# Patient Record
Sex: FEMALE | Race: WHITE | NOT HISPANIC OR LATINO | Employment: FULL TIME | URBAN - METROPOLITAN AREA
[De-identification: names, ages, dates, MRNs, and addresses within clinical notes are randomized per-mention and may not be internally consistent; named-entity substitution may affect disease eponyms.]

---

## 2018-03-26 ENCOUNTER — OFFICE VISIT (OUTPATIENT)
Dept: FAMILY MEDICINE CLINIC | Facility: CLINIC | Age: 53
End: 2018-03-26

## 2018-03-26 VITALS
TEMPERATURE: 97.9 F | DIASTOLIC BLOOD PRESSURE: 99 MMHG | BODY MASS INDEX: 25.68 KG/M2 | SYSTOLIC BLOOD PRESSURE: 156 MMHG | HEART RATE: 101 BPM | WEIGHT: 136 LBS | HEIGHT: 61 IN | RESPIRATION RATE: 16 BRPM

## 2018-03-26 DIAGNOSIS — Z13.6 SCREENING FOR ISCHEMIC HEART DISEASE: ICD-10-CM

## 2018-03-26 DIAGNOSIS — G62.9 SENSORY NEUROPATHY: ICD-10-CM

## 2018-03-26 DIAGNOSIS — I10 ESSENTIAL HYPERTENSION: Primary | ICD-10-CM

## 2018-03-26 PROCEDURE — 99214 OFFICE O/P EST MOD 30 MIN: CPT | Performed by: FAMILY MEDICINE

## 2018-03-26 RX ORDER — IRBESARTAN 150 MG/1
150 TABLET ORAL DAILY
Qty: 30 TABLET | Refills: 1 | Status: SHIPPED | OUTPATIENT
Start: 2018-03-26 | End: 2018-04-26 | Stop reason: SDUPTHER

## 2018-03-26 RX ORDER — NORETHINDRONE AND ETHINYL ESTRADIOL 1 MG-35MCG
KIT ORAL
COMMUNITY
Start: 2018-02-20 | End: 2020-03-18 | Stop reason: ALTCHOICE

## 2018-03-26 RX ORDER — OMEPRAZOLE 20 MG/1
20 CAPSULE, DELAYED RELEASE ORAL DAILY
COMMUNITY
End: 2021-03-18 | Stop reason: ALTCHOICE

## 2018-03-26 RX ORDER — FLUTICASONE PROPIONATE 50 MCG
2 SPRAY, SUSPENSION (ML) NASAL DAILY
COMMUNITY
End: 2021-03-18

## 2018-03-26 NOTE — PROGRESS NOTES
"Chief Complaint   Patient presents with   • Hypertension   • Numbness     left hand, positional       Subjective   This patient presents the office to reestablish.  She has had some problems with her blood pressure over the past 2 months.  Her father does have high blood pressure but it was later in his life.  He is very healthy.  She does have some headaches but no chest pain.    She also has had some intermittent problems of positional numbness of her left hand and arm that has occurred after her oral surgery on March 8.    I have reviewed and updated her medications, medical history and problem list during today's office visit.       Assessment/Plan     Problem List Items Addressed This Visit        Cardiovascular and Mediastinum    Essential hypertension - Primary    Relevant Medications    irbesartan (AVAPRO) 150 MG tablet    Other Relevant Orders    Comprehensive metabolic panel    CBC and Differential    TSH       Nervous and Auditory    Sensory neuropathy, positional, left upper extremity    Relevant Orders    Vitamin D 25 Hydroxy    Vitamin B12 & Folate      Other Visit Diagnoses     Screening for ischemic heart disease        Relevant Orders    Lipid Panel With LDL/HDL Ratio          Orders Placed This Encounter   Procedures   • Comprehensive metabolic panel   • Lipid Panel With LDL/HDL Ratio   • TSH   • Vitamin D 25 Hydroxy   • Vitamin B12 & Folate     Standing Status:   Future     Standing Expiration Date:   4/30/2019   • CBC and Differential     Order Specific Question:   Manual Differential     Answer:   No       F/U in one month         Review of Systems   Cardiovascular: Negative for chest pain.   Neurological: Positive for numbness and headaches.     Objective   /99   Pulse 101   Temp 97.9 °F (36.6 °C) (Oral)   Resp 16   Ht 154.9 cm (61\")   Wt 61.7 kg (136 lb)   BMI 25.70 kg/m²   Body mass index is 25.7 kg/m².  Physical Exam   Constitutional: She is oriented to person, place, and time. " She appears well-developed. No distress.   Eyes: Conjunctivae and lids are normal.   Neck: Carotid bruit is not present.   Cardiovascular: Normal rate, regular rhythm and normal heart sounds.    Pulmonary/Chest: Effort normal and breath sounds normal.   Neurological: She is alert and oriented to person, place, and time. She has normal strength. She displays no atrophy and no tremor. She exhibits normal muscle tone.   Reflex Scores:       Tricep reflexes are 2+ on the right side and 2+ on the left side.       Bicep reflexes are 2+ on the right side and 2+ on the left side.       Brachioradialis reflexes are 2+ on the right side and 2+ on the left side.  Skin: Skin is warm and dry.   Psychiatric: She has a normal mood and affect. Her behavior is normal.   Vitals reviewed.       Social History   Substance Use Topics   • Smoking status: Never Smoker   • Smokeless tobacco: Never Used   • Alcohol use No       Data Reviewed:

## 2018-04-06 LAB
25(OH)D3+25(OH)D2 SERPL-MCNC: 39 NG/ML (ref 30–100)
ALBUMIN SERPL-MCNC: 4.9 G/DL (ref 3.5–5.2)
ALBUMIN/GLOB SERPL: 1.9 G/DL
ALP SERPL-CCNC: 69 U/L (ref 39–117)
ALT SERPL-CCNC: 14 U/L (ref 1–33)
AST SERPL-CCNC: 17 U/L (ref 1–32)
BASOPHILS # BLD AUTO: 0.04 10*3/MM3 (ref 0–0.2)
BASOPHILS NFR BLD AUTO: 0.4 % (ref 0–1.5)
BILIRUB SERPL-MCNC: 0.4 MG/DL (ref 0.1–1.2)
BUN SERPL-MCNC: 21 MG/DL (ref 6–20)
BUN/CREAT SERPL: 25.6 (ref 7–25)
CALCIUM SERPL-MCNC: 10.2 MG/DL (ref 8.6–10.5)
CHLORIDE SERPL-SCNC: 101 MMOL/L (ref 98–107)
CHOLEST SERPL-MCNC: 206 MG/DL (ref 0–200)
CO2 SERPL-SCNC: 24.7 MMOL/L (ref 22–29)
CREAT SERPL-MCNC: 0.82 MG/DL (ref 0.57–1)
EOSINOPHIL # BLD AUTO: 0.18 10*3/MM3 (ref 0–0.7)
EOSINOPHIL NFR BLD AUTO: 1.9 % (ref 0.3–6.2)
ERYTHROCYTE [DISTWIDTH] IN BLOOD BY AUTOMATED COUNT: 12.7 % (ref 11.7–13)
GFR SERPLBLD CREATININE-BSD FMLA CKD-EPI: 73 ML/MIN/1.73
GFR SERPLBLD CREATININE-BSD FMLA CKD-EPI: 88 ML/MIN/1.73
GLOBULIN SER CALC-MCNC: 2.6 GM/DL
GLUCOSE SERPL-MCNC: 96 MG/DL (ref 65–99)
HCT VFR BLD AUTO: 41.5 % (ref 35.6–45.5)
HDLC SERPL-MCNC: 64 MG/DL (ref 40–60)
HGB BLD-MCNC: 13.6 G/DL (ref 11.9–15.5)
IMM GRANULOCYTES # BLD: 0.02 10*3/MM3 (ref 0–0.03)
IMM GRANULOCYTES NFR BLD: 0.2 % (ref 0–0.5)
LDLC SERPL CALC-MCNC: 112 MG/DL (ref 0–100)
LDLC/HDLC SERPL: 1.75 {RATIO}
LYMPHOCYTES # BLD AUTO: 3.49 10*3/MM3 (ref 0.9–4.8)
LYMPHOCYTES NFR BLD AUTO: 36.3 % (ref 19.6–45.3)
MCH RBC QN AUTO: 32.3 PG (ref 26.9–32)
MCHC RBC AUTO-ENTMCNC: 32.8 G/DL (ref 32.4–36.3)
MCV RBC AUTO: 98.6 FL (ref 80.5–98.2)
MONOCYTES # BLD AUTO: 0.57 10*3/MM3 (ref 0.2–1.2)
MONOCYTES NFR BLD AUTO: 5.9 % (ref 5–12)
NEUTROPHILS # BLD AUTO: 5.32 10*3/MM3 (ref 1.9–8.1)
NEUTROPHILS NFR BLD AUTO: 55.3 % (ref 42.7–76)
PLATELET # BLD AUTO: 286 10*3/MM3 (ref 140–500)
POTASSIUM SERPL-SCNC: 4.8 MMOL/L (ref 3.5–5.2)
PROT SERPL-MCNC: 7.5 G/DL (ref 6–8.5)
RBC # BLD AUTO: 4.21 10*6/MM3 (ref 3.9–5.2)
SODIUM SERPL-SCNC: 140 MMOL/L (ref 136–145)
TRIGL SERPL-MCNC: 149 MG/DL (ref 0–150)
TSH SERPL DL<=0.005 MIU/L-ACNC: 3.95 MIU/ML (ref 0.27–4.2)
VLDLC SERPL CALC-MCNC: 29.8 MG/DL (ref 5–40)
WBC # BLD AUTO: 9.62 10*3/MM3 (ref 4.5–10.7)

## 2018-04-26 ENCOUNTER — OFFICE VISIT (OUTPATIENT)
Dept: FAMILY MEDICINE CLINIC | Facility: CLINIC | Age: 53
End: 2018-04-26

## 2018-04-26 VITALS
RESPIRATION RATE: 16 BRPM | DIASTOLIC BLOOD PRESSURE: 84 MMHG | HEART RATE: 75 BPM | SYSTOLIC BLOOD PRESSURE: 138 MMHG | HEIGHT: 61 IN | BODY MASS INDEX: 25.68 KG/M2 | WEIGHT: 136 LBS | TEMPERATURE: 97.9 F

## 2018-04-26 DIAGNOSIS — Z12.11 SCREEN FOR COLON CANCER: ICD-10-CM

## 2018-04-26 DIAGNOSIS — Z23 IMMUNIZATION DUE: ICD-10-CM

## 2018-04-26 DIAGNOSIS — I10 ESSENTIAL HYPERTENSION: Primary | ICD-10-CM

## 2018-04-26 DIAGNOSIS — E78.00 PURE HYPERCHOLESTEROLEMIA: ICD-10-CM

## 2018-04-26 PROCEDURE — 90471 IMMUNIZATION ADMIN: CPT | Performed by: FAMILY MEDICINE

## 2018-04-26 PROCEDURE — 90715 TDAP VACCINE 7 YRS/> IM: CPT | Performed by: FAMILY MEDICINE

## 2018-04-26 PROCEDURE — 99214 OFFICE O/P EST MOD 30 MIN: CPT | Performed by: FAMILY MEDICINE

## 2018-04-26 RX ORDER — IRBESARTAN 150 MG/1
150 TABLET ORAL DAILY
Qty: 90 TABLET | Refills: 3 | Status: SHIPPED | OUTPATIENT
Start: 2018-04-26 | End: 2018-07-02 | Stop reason: SDUPTHER

## 2018-04-26 NOTE — PATIENT INSTRUCTIONS

## 2018-04-26 NOTE — PROGRESS NOTES
"Chief Complaint   Patient presents with   • Hypertension   • Results     review labs       Subjective     Radha Higgins presents to the office today to refill her medications. No medication side effects are reported.  Her blood pressure is to goal.  She occasionally gets an afternoon reading that is higher than goal.  She is tolerating the medication.  Labs have been reviewed and do show pure hypercholesterolemia that does not require treatment at this time.  Her overall cardiovascular ten-year risk is only 3%.    The tingling in her left upper extremity after her recent dental procedure has improved greatly.  She will contact me if that symptom is still present in 6 weeks from now.    I have reviewed and updated her medications, medical history and problem list during today's office visit.     Patient Care Team:  Henry Jacinto MD as PCP - General (Family Medicine)  Golden Kelly MD as Consulting Physician (Obstetrics and Gynecology)    Social History   Substance Use Topics   • Smoking status: Never Smoker   • Smokeless tobacco: Never Used   • Alcohol use No       Review of Systems   Constitutional: Negative for fatigue.   Cardiovascular: Negative for chest pain.   Neurological:        Tingling left upper extremity       Objective     /84 (BP Location: Right arm, Patient Position: Sitting, Cuff Size: Adult)   Pulse 75   Temp 97.9 °F (36.6 °C) (Oral)   Resp 16   Ht 154.9 cm (61\")   Wt 61.7 kg (136 lb)   BMI 25.70 kg/m²     Body mass index is 25.7 kg/m².    Physical Exam   Constitutional: She is oriented to person, place, and time. She appears well-developed. No distress.   Eyes: Conjunctivae and lids are normal.   Neck: Carotid bruit is not present.   Cardiovascular: Normal rate, regular rhythm and normal heart sounds.    Pulmonary/Chest: Effort normal and breath sounds normal.   Neurological: She is alert and oriented to person, place, and time.   Skin: Skin is warm and dry.   Psychiatric: She has a " normal mood and affect. Her behavior is normal.   Vitals reviewed.      Data Reviewed:         Lab Results   Component Value Date    GLU 96 04/06/2018    BUN 21 (H) 04/06/2018    CREATININE 0.82 04/06/2018    EGFRIFNONA 73 04/06/2018    EGFRIFAFRI 88 04/06/2018     04/06/2018    K 4.8 04/06/2018     04/06/2018    CALCIUM 10.2 04/06/2018    PROTENTOTREF 7.5 04/06/2018    ALBUMIN 4.90 04/06/2018    LABGLOBREF 2.6 04/06/2018    BILITOT 0.4 04/06/2018    ALKPHOS 69 04/06/2018    AST 17 04/06/2018    ALT 14 04/06/2018     CBC w/ diff:   Lab Results   Component Value Date    WBC 9.62 04/06/2018    RBC 4.21 04/06/2018    HGB 13.6 04/06/2018    HCT 41.5 04/06/2018    MCV 98.6 (H) 04/06/2018    MCH 32.3 (H) 04/06/2018    MCHC 32.8 04/06/2018    RDW 12.7 04/06/2018     04/06/2018    NEUTRORELPCT 55.3 04/06/2018    LYMPHORELPCT 36.3 04/06/2018    MONORELPCT 5.9 04/06/2018    EOSRELPCT 1.9 04/06/2018    BASORELPCT 0.4 04/06/2018     LIPID PANEL:  Lab Results   Component Value Date    CHLPL 206 (H) 04/06/2018    TRIG 149 04/06/2018    HDL 64 (H) 04/06/2018    VLDL 29.8 04/06/2018     (H) 04/06/2018    LDLHDL 1.75 04/06/2018     TSH:  Lab Results   Component Value Date    TSH 3.950 04/06/2018       Assessment/Plan     Problem List Items Addressed This Visit        Cardiovascular and Mediastinum    Essential hypertension - Primary     Hypertension is improving with treatment.  Continue current treatment regimen.  Blood pressure will be reassessed at the next regular appointment.         Relevant Medications    irbesartan (AVAPRO) 150 MG tablet    Other Relevant Orders    Comprehensive metabolic panel    CBC and Differential    TSH    Pure hypercholesterolemia     Lipid abnormalities are newly identified.  Nutritional counseling was provided.  Lipids will be reassessed at our next regular appointment..         Relevant Orders    Lipid Panel With LDL/HDL Ratio      Other Visit Diagnoses     Screen for colon  cancer        Relevant Orders    Amb referral for Screening Colonoscopy (Completed)    Immunization due        Relevant Orders    Tdap Vaccine Greater Than or Equal To 8yo IM          Orders Placed This Encounter   Procedures   • Tdap Vaccine Greater Than or Equal To 8yo IM   • Comprehensive metabolic panel     Standing Status:   Future     Standing Expiration Date:   5/31/2019   • Lipid Panel With LDL/HDL Ratio     Standing Status:   Future     Standing Expiration Date:   5/31/2019   • TSH     Standing Status:   Future     Standing Expiration Date:   5/31/2019   • Amb referral for Screening Colonoscopy     Referral Priority:   Routine     Referral Type:   Diagnostic Medical     Referral Reason:   Specialty Services Required     Referred to Provider:   Dale Abbott MD     Requested Specialty:   Gastroenterology     Number of Visits Requested:   1   • CBC and Differential     Standing Status:   Future     Standing Expiration Date:   5/31/2019     Order Specific Question:   Manual Differential     Answer:   No         Current Outpatient Prescriptions:   •  Calcium Carbonate-Vitamin D3 (CALCIUM 600-D) 600-400 MG-UNIT tablet, Take 2 tablets by mouth Daily., Disp: , Rfl:   •  fluticasone (FLONASE) 50 MCG/ACT nasal spray, 2 sprays into each nostril Daily., Disp: , Rfl:   •  irbesartan (AVAPRO) 150 MG tablet, Take 1 tablet by mouth Daily., Disp: 90 tablet, Rfl: 3  •  Loratadine (ALAVERT PO), Take  by mouth., Disp: , Rfl:   •  Multiple Vitamins-Minerals (MULTIVITAMIN ADULT PO), Take  by mouth., Disp: , Rfl:   •  omeprazole (priLOSEC) 20 MG capsule, Take 20 mg by mouth Daily., Disp: , Rfl:   •  PIRMELLA 1/35 1-35 MG-MCG per tablet, , Disp: , Rfl:     Return in about 1 year (around 4/26/2019) for Recheck.

## 2018-04-26 NOTE — ASSESSMENT & PLAN NOTE
Lipid abnormalities are newly identified.  Nutritional counseling was provided.  Lipids will be reassessed at our next regular appointment..

## 2018-07-02 ENCOUNTER — TELEPHONE (OUTPATIENT)
Dept: FAMILY MEDICINE CLINIC | Facility: CLINIC | Age: 53
End: 2018-07-02

## 2018-07-02 DIAGNOSIS — I10 ESSENTIAL HYPERTENSION: ICD-10-CM

## 2018-07-02 RX ORDER — IRBESARTAN 150 MG/1
150 TABLET ORAL DAILY
Qty: 90 TABLET | Refills: 2 | Status: SHIPPED | OUTPATIENT
Start: 2018-07-02 | End: 2019-03-21 | Stop reason: ALTCHOICE

## 2018-08-30 ENCOUNTER — HOSPITAL ENCOUNTER (OUTPATIENT)
Dept: URGENT CARE | Facility: CLINIC | Age: 53
Setting detail: SPECIMEN
Discharge: HOME | End: 2018-08-30
Attending: FAMILY MEDICINE | Admitting: FAMILY MEDICINE

## 2018-08-30 LAB
BACTERIA SPEC AEROBE CULT: NORMAL
Lab: NORMAL
MICRO REPORT STATUS: NORMAL
SPECIMEN SOURCE: NORMAL

## 2019-02-19 ENCOUNTER — RESULTS ENCOUNTER (OUTPATIENT)
Dept: FAMILY MEDICINE CLINIC | Facility: CLINIC | Age: 54
End: 2019-02-19

## 2019-02-19 DIAGNOSIS — G62.9 SENSORY NEUROPATHY: ICD-10-CM

## 2019-03-08 LAB
FOLATE SERPL-MCNC: >20 NG/ML (ref 4.78–24.2)
VIT B12 SERPL-MCNC: 541 PG/ML (ref 211–946)

## 2019-03-21 ENCOUNTER — OFFICE VISIT (OUTPATIENT)
Dept: FAMILY MEDICINE CLINIC | Facility: CLINIC | Age: 54
End: 2019-03-21

## 2019-03-21 VITALS
WEIGHT: 139 LBS | RESPIRATION RATE: 16 BRPM | HEIGHT: 61 IN | BODY MASS INDEX: 26.24 KG/M2 | DIASTOLIC BLOOD PRESSURE: 93 MMHG | SYSTOLIC BLOOD PRESSURE: 136 MMHG | TEMPERATURE: 97.2 F | HEART RATE: 83 BPM

## 2019-03-21 DIAGNOSIS — J30.1 NON-SEASONAL ALLERGIC RHINITIS DUE TO POLLEN: ICD-10-CM

## 2019-03-21 DIAGNOSIS — I73.00 RAYNAUD'S DISEASE WITHOUT GANGRENE: ICD-10-CM

## 2019-03-21 DIAGNOSIS — I10 ESSENTIAL HYPERTENSION: Primary | ICD-10-CM

## 2019-03-21 DIAGNOSIS — E78.00 PURE HYPERCHOLESTEROLEMIA: ICD-10-CM

## 2019-03-21 DIAGNOSIS — Z12.11 SCREEN FOR COLON CANCER: ICD-10-CM

## 2019-03-21 DIAGNOSIS — Z11.59 NEED FOR HEPATITIS C SCREENING TEST: ICD-10-CM

## 2019-03-21 PROBLEM — G62.9 SENSORY NEUROPATHY: Status: RESOLVED | Noted: 2018-03-26 | Resolved: 2019-03-21

## 2019-03-21 PROCEDURE — 99214 OFFICE O/P EST MOD 30 MIN: CPT | Performed by: FAMILY MEDICINE

## 2019-03-21 RX ORDER — TELMISARTAN 80 MG/1
80 TABLET ORAL DAILY
Qty: 90 TABLET | Refills: 3 | Status: SHIPPED | OUTPATIENT
Start: 2019-03-21 | End: 2020-03-18 | Stop reason: SDUPTHER

## 2019-03-21 NOTE — ASSESSMENT & PLAN NOTE
Lipid abnormalities are unchanged.  Nutritional counseling was provided.  Lipids will be reassessed in 1 year.

## 2019-03-21 NOTE — PROGRESS NOTES
"Chief Complaint   Patient presents with   • Hypertension       Subjective     Radha Higgins presents to the office today to refill her medications. No medication side effects are reported.  Today her blood pressure is slightly elevated.  We will switched to telmisartan due to recall concerns of Irbesartan.  Cholesterol is stable pending lab results.  Retinologist disease is stable.  Overall, she feels well.  She is suffering from seasonal allergies and will continue loratadine and fluticasone.  Her allergies manifest with itchy eyes.    I have reviewed and updated her medications, medical history and problem list during today's office visit.     Patient Care Team:  Henry Jacinto MD as PCP - General (Family Medicine)  Golden Kelly MD as Consulting Physician (Obstetrics and Gynecology)    Social History     Tobacco Use   • Smoking status: Never Smoker   • Smokeless tobacco: Never Used   Substance Use Topics   • Alcohol use: No       Review of Systems   Constitutional: Negative for fatigue.   Cardiovascular: Negative for chest pain.       Objective     /93   Pulse 83   Temp 97.2 °F (36.2 °C) (Oral)   Resp 16   Ht 154.9 cm (61\")   Wt 63 kg (139 lb)   BMI 26.26 kg/m²     Body mass index is 26.26 kg/m².    Physical Exam   Constitutional: She is oriented to person, place, and time. She appears well-developed. No distress.   Eyes: Conjunctivae and lids are normal.   Neck: Carotid bruit is not present.   Cardiovascular: Normal rate, regular rhythm and normal heart sounds.   Pulmonary/Chest: Effort normal and breath sounds normal.   Neurological: She is alert and oriented to person, place, and time.   Skin: Skin is warm and dry.   Psychiatric: She has a normal mood and affect. Her behavior is normal.   Vitals reviewed.      Data Reviewed:                    Assessment/Plan     Problem List Items Addressed This Visit     Essential hypertension - Primary     Hypertension is worsening.  Medication changes per " orders. Change to Telmisartan 80 mg daily due to recall of Irbesartan.  Patient will take twice daily blood pressure readings for about 1-2 weeks and give us those results either through my chart message or through a letter or phone call along with progress update after starting telmisartan for about 1 month.    Blood pressure will be reassessed at the next regular appointment.         Relevant Medications    telmisartan (MICARDIS) 80 MG tablet    Raynaud's disease without gangrene    Pure hypercholesterolemia     Lipid abnormalities are unchanged.  Nutritional counseling was provided.  Lipids will be reassessed in 1 year.         Non-seasonal allergic rhinitis due to pollen     Stable           Other Visit Diagnoses     Screen for colon cancer        Relevant Orders    Amb referral for Screening Colonoscopy    Need for hepatitis C screening test        Relevant Orders    Hepatitis C antibody          Orders Placed This Encounter   Procedures   • Hepatitis C antibody     Standing Status:   Future     Standing Expiration Date:   3/21/2020   • Amb referral for Screening Colonoscopy     Referral Priority:   Routine     Referral Type:   Diagnostic Medical     Referral Reason:   Specialty Services Required     Referred to Provider:   Vipul Martins MD     Number of Visits Requested:   1         Current Outpatient Medications:   •  Calcium Carbonate-Vitamin D3 (CALCIUM 600-D) 600-400 MG-UNIT tablet, Take 2 tablets by mouth Daily., Disp: , Rfl:   •  fluticasone (FLONASE) 50 MCG/ACT nasal spray, 2 sprays into each nostril Daily., Disp: , Rfl:   •  Loratadine (ALAVERT PO), Take  by mouth., Disp: , Rfl:   •  Multiple Vitamins-Minerals (MULTIVITAMIN ADULT PO), Take  by mouth., Disp: , Rfl:   •  omeprazole (priLOSEC) 20 MG capsule, Take 20 mg by mouth Daily., Disp: , Rfl:   •  PIRMELLA 1/35 1-35 MG-MCG per tablet, , Disp: , Rfl:   •  telmisartan (MICARDIS) 80 MG tablet, Take 1 tablet by mouth Daily., Disp: 90 tablet, Rfl:  3    Return in about 1 year (around 3/21/2020).

## 2019-03-21 NOTE — ASSESSMENT & PLAN NOTE
Hypertension is worsening.  Medication changes per orders. Change to Telmisartan 80 mg daily due to recall of Irbesartan.  Patient will take twice daily blood pressure readings for about 1-2 weeks and give us those results either through my chart message or through a letter or phone call along with progress update after starting telmisartan for about 1 month.    Blood pressure will be reassessed at the next regular appointment.

## 2019-03-21 NOTE — PATIENT INSTRUCTIONS
"Check on insurance coverage and cost for Shingrix (newest shingles vaccine) and get the immunization at your local pharmacy. It is more effective than the old Zostavax vaccine and is recommended even if you have had the Zostavax vaccine in the past. For more information, please look at the website below:    https://www.cdc.gov/vaccines/vpd/shingles/public/shingrix/index.html        Fat and Cholesterol Restricted Diet  Getting too much fat and cholesterol in your diet may cause health problems. Following this diet helps keep your fat and cholesterol at normal levels. This can keep you from getting sick.  WHAT TYPES OF FAT SHOULD I CHOOSE?  · Choose monosaturated and polyunsaturated fats. These are found in foods such as olive oil, canola oil, flaxseeds, walnuts, almonds, and seeds.  · Eat more omega-3 fats. Good choices include salmon, mackerel, sardines, tuna, flaxseed oil, and ground flaxseeds.  · Limit saturated fats. These are in animal products such as meats, butter, and cream. They can also be in plant products such as palm oil, palm kernel oil, and coconut oil.  ·   ·   · Avoid foods with partially hydrogenated oils in them. These contain trans fats. Examples of foods that have trans fats are stick margarine, some tub margarines, cookies, crackers, and other baked goods.  WHAT GENERAL GUIDELINES DO I NEED TO FOLLOW?    · Check food labels. Look for the words \"trans fat\" and \"saturated fat.\"  · When preparing a meal:  ¨ Fill half of your plate with vegetables and green salads.  ¨ Fill one fourth of your plate with whole grains. Look for the word \"whole\" as the first word in the ingredient list.  ¨ Fill one fourth of your plate with lean protein foods.  · Limit fruit to two servings a day. Choose fruit instead of juice.  · Eat more foods with soluble fiber. Examples of foods with this type of fiber are apples, broccoli, carrots, beans, peas, and barley. Try to get 20-30 g (grams) of fiber per day.  · Eat more " home-cooked foods. Eat less at restaurants and buffets.  · Limit or avoid alcohol.  · Limit foods high in starch and sugar.  · Limit fried foods.  · Cook foods without frying them. Baking, boiling, grilling, and broiling are all great options.  · Lose weight if you are overweight. Losing even a small amount of weight can help your overall health. It can also help prevent diseases such as diabetes and heart disease.  WHAT FOODS CAN I EAT?  Grains  Whole grains, such as whole wheat or whole grain breads, crackers, cereals, and pasta. Unsweetened oatmeal, bulgur, barley, quinoa, or brown rice. Corn or whole wheat flour tortillas.  Vegetables  Fresh or frozen vegetables (raw, steamed, roasted, or grilled). Green salads.  Fruits  All fresh, canned (in natural juice), or frozen fruits.  Meat and Other Protein Products  Ground beef (85% or leaner), grass-fed beef, or beef trimmed of fat. Skinless chicken or turkey. Ground chicken or turkey. Pork trimmed of fat. All fish and seafood. Eggs. Dried beans, peas, or lentils. Unsalted nuts or seeds. Unsalted canned or dry beans.  Dairy  Low-fat dairy products, such as skim or 1% milk, 2% or reduced-fat cheeses, low-fat ricotta or cottage cheese, or plain low-fat yogurt.  Fats and Oils  Tub margarines without trans fats. Light or reduced-fat mayonnaise and salad dressings. Avocado. Olive, canola, sesame, or safflower oils. Natural peanut or almond butter (choose ones without added sugar and oil).  The items listed above may not be a complete list of recommended foods or beverages. Contact your dietitian for more options.  WHAT FOODS ARE NOT RECOMMENDED?  Grains  White bread. White pasta. White rice. Cornbread. Bagels, pastries, and croissants. Crackers that contain trans fat.  Vegetables  White potatoes. Corn. Creamed or fried vegetables. Vegetables in a cheese sauce.  Fruits  Dried fruits. Canned fruit in light or heavy syrup. Fruit juice.  Meat and Other Protein Products  Fatty  cuts of meat. Ribs, chicken wings, vega, sausage, bologna, salami, chitterlings, fatback, hot dogs, bratwurst, and packaged luncheon meats. Liver and organ meats.  Dairy  Whole or 2% milk, cream, half-and-half, and cream cheese. Whole milk cheeses. Whole-fat or sweetened yogurt. Full-fat cheeses. Nondairy creamers and whipped toppings. Processed cheese, cheese spreads, or cheese curds.  Sweets and Desserts  Corn syrup, sugars, honey, and molasses. Candy. Jam and jelly. Syrup. Sweetened cereals. Cookies, pies, cakes, donuts, muffins, and ice cream.  Fats and Oils  Butter, stick margarine, lard, shortening, ghee, or vega fat. Coconut, palm kernel, or palm oils.  Beverages  Alcohol. Sweetened drinks (such as sodas, lemonade, and fruit drinks or punches).  The items listed above may not be a complete list of foods and beverages to avoid. Contact your dietitian for more information.  Document Released: 06/18/2013 Document Revised: 08/21/2015 Document Reviewed: 03/19/2015  [employer]® Patient Information ©2015 [employer]. This information is not intended to replace advice given to you by your health care provider. Make sure you discuss any questions you have with your health care provider.

## 2019-03-22 ENCOUNTER — RESULTS ENCOUNTER (OUTPATIENT)
Dept: FAMILY MEDICINE CLINIC | Facility: CLINIC | Age: 54
End: 2019-03-22

## 2019-03-22 DIAGNOSIS — I10 ESSENTIAL HYPERTENSION: ICD-10-CM

## 2019-03-22 DIAGNOSIS — E78.00 PURE HYPERCHOLESTEROLEMIA: ICD-10-CM

## 2019-03-22 DIAGNOSIS — Z11.59 NEED FOR HEPATITIS C SCREENING TEST: ICD-10-CM

## 2019-03-29 LAB
ALBUMIN SERPL-MCNC: 4.5 G/DL (ref 3.5–5.2)
ALBUMIN/GLOB SERPL: 1.7 G/DL
ALP SERPL-CCNC: 52 U/L (ref 39–117)
ALT SERPL-CCNC: 10 U/L (ref 1–33)
AST SERPL-CCNC: 15 U/L (ref 1–32)
BASOPHILS # BLD AUTO: 0.05 10*3/MM3 (ref 0–0.2)
BASOPHILS NFR BLD AUTO: 0.6 % (ref 0–1.5)
BILIRUB SERPL-MCNC: 0.5 MG/DL (ref 0.2–1.2)
BUN SERPL-MCNC: 14 MG/DL (ref 6–20)
BUN/CREAT SERPL: 16.9 (ref 7–25)
CALCIUM SERPL-MCNC: 9.2 MG/DL (ref 8.6–10.5)
CHLORIDE SERPL-SCNC: 101 MMOL/L (ref 98–107)
CHOLEST SERPL-MCNC: 193 MG/DL (ref 0–200)
CO2 SERPL-SCNC: 23.4 MMOL/L (ref 22–29)
CREAT SERPL-MCNC: 0.83 MG/DL (ref 0.57–1)
EOSINOPHIL # BLD AUTO: 0.08 10*3/MM3 (ref 0–0.4)
EOSINOPHIL NFR BLD AUTO: 1 % (ref 0.3–6.2)
ERYTHROCYTE [DISTWIDTH] IN BLOOD BY AUTOMATED COUNT: 12.3 % (ref 12.3–15.4)
GLOBULIN SER CALC-MCNC: 2.7 GM/DL
GLUCOSE SERPL-MCNC: 86 MG/DL (ref 65–99)
HCT VFR BLD AUTO: 41 % (ref 34–46.6)
HCV AB S/CO SERPL IA: <0.1 S/CO RATIO (ref 0–0.9)
HDLC SERPL-MCNC: 62 MG/DL (ref 40–60)
HGB BLD-MCNC: 13 G/DL (ref 12–15.9)
IMM GRANULOCYTES # BLD AUTO: 0.02 10*3/MM3 (ref 0–0.05)
IMM GRANULOCYTES NFR BLD AUTO: 0.2 % (ref 0–0.5)
LDLC SERPL CALC-MCNC: 105 MG/DL (ref 0–100)
LDLC/HDLC SERPL: 1.69 {RATIO}
LYMPHOCYTES # BLD AUTO: 2.62 10*3/MM3 (ref 0.7–3.1)
LYMPHOCYTES NFR BLD AUTO: 32 % (ref 19.6–45.3)
MCH RBC QN AUTO: 32.1 PG (ref 26.6–33)
MCHC RBC AUTO-ENTMCNC: 31.7 G/DL (ref 31.5–35.7)
MCV RBC AUTO: 101.2 FL (ref 79–97)
MONOCYTES # BLD AUTO: 0.45 10*3/MM3 (ref 0.1–0.9)
MONOCYTES NFR BLD AUTO: 5.5 % (ref 5–12)
NEUTROPHILS # BLD AUTO: 4.98 10*3/MM3 (ref 1.4–7)
NEUTROPHILS NFR BLD AUTO: 60.7 % (ref 42.7–76)
NRBC BLD AUTO-RTO: 0 /100 WBC (ref 0–0)
PLATELET # BLD AUTO: 269 10*3/MM3 (ref 140–450)
POTASSIUM SERPL-SCNC: 4.4 MMOL/L (ref 3.5–5.2)
PROT SERPL-MCNC: 7.2 G/DL (ref 6–8.5)
RBC # BLD AUTO: 4.05 10*6/MM3 (ref 3.77–5.28)
SODIUM SERPL-SCNC: 139 MMOL/L (ref 136–145)
TRIGL SERPL-MCNC: 132 MG/DL (ref 0–150)
TSH SERPL DL<=0.005 MIU/L-ACNC: 3.46 MIU/ML (ref 0.27–4.2)
VLDLC SERPL CALC-MCNC: 26.4 MG/DL (ref 5–40)
WBC # BLD AUTO: 8.2 10*3/MM3 (ref 3.4–10.8)

## 2019-07-08 ENCOUNTER — PATIENT MESSAGE (OUTPATIENT)
Dept: FAMILY MEDICINE CLINIC | Facility: CLINIC | Age: 54
End: 2019-07-08

## 2019-07-08 NOTE — TELEPHONE ENCOUNTER
Savannah, Janie, LPN 7/8/2019 12:14 PM EDT        ----- Message -----  From: Radha Higgins  Sent: 7/8/2019 11:55 AM  To: [facility] Clinical Pool  Subject: Non-Urgent Medical Question     ----- Message from Laura, Generic sent at 7/8/2019 11:55 AM EDT -----    Hi Dr Jacinto  I am finally getting around to scheduling my colonoscopy. My  who is also a patient of yours went to [facility] last year for his test and I also received registration papers from them last year that I never sent in. When I saw you this year and you ordered the test I received registration papers from [facility]. Is there a preference as to which facility I use?   Thanks!  Radha Higgins

## 2019-07-09 ENCOUNTER — TELEPHONE (OUTPATIENT)
Dept: FAMILY MEDICINE CLINIC | Facility: CLINIC | Age: 54
End: 2019-07-09

## 2019-07-09 NOTE — TELEPHONE ENCOUNTER
Regarding: Non-Urgent Medical Question  Contact: [phone]  ----- Message from Henry Jacinto MD sent at 7/8/2019  6:10 PM EDT -----  Janie please set her up to go to [facility].  Let referral know that she needs her order changed to this practice instead of the [facility] gastro[facility]ologist.  Please refer to Dr. Abrahan Abbott from that group please.  Call her and let her know that you have  taken this step on her behalf.  Thank you so much, Dr. Jacinto     ----- Message from Radha Higgins to Henry Jacinto MD sent at 7/8/2019  1:31 PM -----   We are pretty familiar with [facility].  I took my  and my mom and dad there so if that one is ok, I'll probably go there.  I have last years paperwork to be filled out.  Do you think I can use it ?    ----- Message -----  From: Henry Jacinto MD  Sent: 7/8/19, 12:29 PM  To: Radha Higgins  Subject: RE: Non-Urgent Medical Question    Both are excellent. Let me know if you have a preference. Dr. Jacinto    ----- Message -----     From: Radha Higgins     Sent: 7/8/2019 11:55 AM EDT       To: Henry Jacinto MD  Subject: Non-Urgent Medical Question    Hi Dr Jacinto  I am finally getting around to scheduling my colonoscopy.  My  who is also a patient of yours went to [facility] last year for his test and I also received registration papers from them last year that I never sent in.  When I saw you this year and you ordered the test I received registration papers from [facility].  Is there a preference as to which facility I use?    Thanks!  Radha Higgins

## 2019-09-12 VITALS
RESPIRATION RATE: 18 BRPM | DIASTOLIC BLOOD PRESSURE: 103 MMHG | HEART RATE: 86 BPM | OXYGEN SATURATION: 97 % | SYSTOLIC BLOOD PRESSURE: 113 MMHG | SYSTOLIC BLOOD PRESSURE: 119 MMHG | OXYGEN SATURATION: 100 % | HEART RATE: 98 BPM | DIASTOLIC BLOOD PRESSURE: 68 MMHG | SYSTOLIC BLOOD PRESSURE: 147 MMHG | RESPIRATION RATE: 24 BRPM | WEIGHT: 134 LBS | DIASTOLIC BLOOD PRESSURE: 90 MMHG | DIASTOLIC BLOOD PRESSURE: 66 MMHG | SYSTOLIC BLOOD PRESSURE: 102 MMHG | HEART RATE: 83 BPM | SYSTOLIC BLOOD PRESSURE: 103 MMHG | SYSTOLIC BLOOD PRESSURE: 116 MMHG | RESPIRATION RATE: 20 BRPM | HEART RATE: 93 BPM | SYSTOLIC BLOOD PRESSURE: 138 MMHG | HEART RATE: 84 BPM | RESPIRATION RATE: 17 BRPM | HEART RATE: 90 BPM | OXYGEN SATURATION: 98 % | DIASTOLIC BLOOD PRESSURE: 83 MMHG | DIASTOLIC BLOOD PRESSURE: 64 MMHG | SYSTOLIC BLOOD PRESSURE: 163 MMHG | HEART RATE: 70 BPM | DIASTOLIC BLOOD PRESSURE: 80 MMHG | RESPIRATION RATE: 22 BRPM | OXYGEN SATURATION: 99 % | DIASTOLIC BLOOD PRESSURE: 62 MMHG | DIASTOLIC BLOOD PRESSURE: 81 MMHG | SYSTOLIC BLOOD PRESSURE: 164 MMHG | RESPIRATION RATE: 12 BRPM | HEART RATE: 82 BPM | HEIGHT: 61 IN | DIASTOLIC BLOOD PRESSURE: 55 MMHG | TEMPERATURE: 96.6 F | SYSTOLIC BLOOD PRESSURE: 112 MMHG | SYSTOLIC BLOOD PRESSURE: 129 MMHG | RESPIRATION RATE: 16 BRPM | TEMPERATURE: 97.3 F

## 2019-09-13 ENCOUNTER — OFFICE (OUTPATIENT)
Dept: URBAN - METROPOLITAN AREA PATHOLOGY 4 | Facility: PATHOLOGY | Age: 54
End: 2019-09-13
Payer: COMMERCIAL

## 2019-09-13 ENCOUNTER — AMBULATORY SURGICAL CENTER (OUTPATIENT)
Dept: URBAN - METROPOLITAN AREA SURGERY 17 | Facility: SURGERY | Age: 54
End: 2019-09-13
Payer: COMMERCIAL

## 2019-09-13 DIAGNOSIS — K63.5 POLYP OF COLON: ICD-10-CM

## 2019-09-13 DIAGNOSIS — D12.3 BENIGN NEOPLASM OF TRANSVERSE COLON: ICD-10-CM

## 2019-09-13 DIAGNOSIS — Z12.11 ENCOUNTER FOR SCREENING FOR MALIGNANT NEOPLASM OF COLON: ICD-10-CM

## 2019-09-13 DIAGNOSIS — K62.89 OTHER SPECIFIED DISEASES OF ANUS AND RECTUM: ICD-10-CM

## 2019-09-13 LAB
GI HISTOLOGY: A. UNSPECIFIED: (no result)
GI HISTOLOGY: B. SELECT: (no result)
GI HISTOLOGY: PDF REPORT: (no result)

## 2019-09-13 PROCEDURE — 45380 COLONOSCOPY AND BIOPSY: CPT | Mod: 33 | Performed by: INTERNAL MEDICINE

## 2019-09-13 PROCEDURE — 88305 TISSUE EXAM BY PATHOLOGIST: CPT | Mod: 33 | Performed by: INTERNAL MEDICINE

## 2019-09-13 NOTE — SERVICEHPINOTES
53 yo F with h/o HTN. No fam hx colon ca/polyps. She denies having any GI symptoms.  She denies weight loss.  Has never had colonoscopy before. Discussed r/b/a of the procedure with the patient in the pre op area.

## 2020-03-03 DIAGNOSIS — I10 ESSENTIAL HYPERTENSION: ICD-10-CM

## 2020-03-03 RX ORDER — TELMISARTAN 80 MG/1
TABLET ORAL
Qty: 90 TABLET | Refills: 3 | OUTPATIENT
Start: 2020-03-03

## 2020-03-06 DIAGNOSIS — I10 ESSENTIAL HYPERTENSION: Primary | ICD-10-CM

## 2020-03-06 DIAGNOSIS — E78.00 PURE HYPERCHOLESTEROLEMIA: ICD-10-CM

## 2020-03-06 DIAGNOSIS — R79.9 ABNORMAL BLOOD CHEMISTRY: ICD-10-CM

## 2020-03-12 LAB
ALBUMIN SERPL-MCNC: 4.7 G/DL (ref 3.8–4.9)
ALBUMIN/GLOB SERPL: 1.8 {RATIO} (ref 1.2–2.2)
ALP SERPL-CCNC: 82 IU/L (ref 39–117)
ALT SERPL-CCNC: 25 IU/L (ref 0–32)
AST SERPL-CCNC: 30 IU/L (ref 0–40)
BASOPHILS # BLD AUTO: 0.1 X10E3/UL (ref 0–0.2)
BASOPHILS NFR BLD AUTO: 1 %
BILIRUB SERPL-MCNC: 0.5 MG/DL (ref 0–1.2)
BUN SERPL-MCNC: 17 MG/DL (ref 6–24)
BUN/CREAT SERPL: 22 (ref 9–23)
CALCIUM SERPL-MCNC: 9.8 MG/DL (ref 8.7–10.2)
CHLORIDE SERPL-SCNC: 100 MMOL/L (ref 96–106)
CHOLEST SERPL-MCNC: 206 MG/DL (ref 100–199)
CO2 SERPL-SCNC: 23 MMOL/L (ref 20–29)
CREAT SERPL-MCNC: 0.76 MG/DL (ref 0.57–1)
EOSINOPHIL # BLD AUTO: 0.1 X10E3/UL (ref 0–0.4)
EOSINOPHIL NFR BLD AUTO: 2 %
ERYTHROCYTE [DISTWIDTH] IN BLOOD BY AUTOMATED COUNT: 12 % (ref 11.7–15.4)
GLOBULIN SER CALC-MCNC: 2.6 G/DL (ref 1.5–4.5)
GLUCOSE SERPL-MCNC: 96 MG/DL (ref 65–99)
HCT VFR BLD AUTO: 39.5 % (ref 34–46.6)
HDLC SERPL-MCNC: 89 MG/DL
HGB BLD-MCNC: 12.9 G/DL (ref 11.1–15.9)
IMM GRANULOCYTES # BLD AUTO: 0 X10E3/UL (ref 0–0.1)
IMM GRANULOCYTES NFR BLD AUTO: 0 %
LDLC SERPL CALC-MCNC: 101 MG/DL (ref 0–99)
LYMPHOCYTES # BLD AUTO: 2.6 X10E3/UL (ref 0.7–3.1)
LYMPHOCYTES NFR BLD AUTO: 41 %
MCH RBC QN AUTO: 30.6 PG (ref 26.6–33)
MCHC RBC AUTO-ENTMCNC: 32.7 G/DL (ref 31.5–35.7)
MCV RBC AUTO: 94 FL (ref 79–97)
MONOCYTES # BLD AUTO: 0.4 X10E3/UL (ref 0.1–0.9)
MONOCYTES NFR BLD AUTO: 7 %
NEUTROPHILS # BLD AUTO: 3.2 X10E3/UL (ref 1.4–7)
NEUTROPHILS NFR BLD AUTO: 49 %
PLATELET # BLD AUTO: 273 X10E3/UL (ref 150–450)
POTASSIUM SERPL-SCNC: 4.5 MMOL/L (ref 3.5–5.2)
PROT SERPL-MCNC: 7.3 G/DL (ref 6–8.5)
RBC # BLD AUTO: 4.22 X10E6/UL (ref 3.77–5.28)
SODIUM SERPL-SCNC: 140 MMOL/L (ref 134–144)
TRIGL SERPL-MCNC: 79 MG/DL (ref 0–149)
VLDLC SERPL CALC-MCNC: 16 MG/DL (ref 5–40)
WBC # BLD AUTO: 6.4 X10E3/UL (ref 3.4–10.8)

## 2020-03-18 ENCOUNTER — OFFICE VISIT (OUTPATIENT)
Dept: FAMILY MEDICINE CLINIC | Facility: CLINIC | Age: 55
End: 2020-03-18

## 2020-03-18 VITALS
WEIGHT: 139 LBS | DIASTOLIC BLOOD PRESSURE: 80 MMHG | HEIGHT: 61 IN | TEMPERATURE: 98 F | HEART RATE: 75 BPM | RESPIRATION RATE: 16 BRPM | BODY MASS INDEX: 26.24 KG/M2 | OXYGEN SATURATION: 95 % | SYSTOLIC BLOOD PRESSURE: 122 MMHG

## 2020-03-18 DIAGNOSIS — Z00.00 ENCOUNTER FOR WELLNESS EXAMINATION IN ADULT: Primary | ICD-10-CM

## 2020-03-18 DIAGNOSIS — I10 ESSENTIAL HYPERTENSION: ICD-10-CM

## 2020-03-18 DIAGNOSIS — Z13.6 SCREENING FOR ISCHEMIC HEART DISEASE: ICD-10-CM

## 2020-03-18 DIAGNOSIS — J30.1 NON-SEASONAL ALLERGIC RHINITIS DUE TO POLLEN: ICD-10-CM

## 2020-03-18 DIAGNOSIS — K21.9 GASTROESOPHAGEAL REFLUX DISEASE WITHOUT ESOPHAGITIS: ICD-10-CM

## 2020-03-18 DIAGNOSIS — E78.00 PURE HYPERCHOLESTEROLEMIA: ICD-10-CM

## 2020-03-18 PROBLEM — J20.8 ACUTE BACTERIAL BRONCHITIS: Status: ACTIVE | Noted: 2017-02-18

## 2020-03-18 PROBLEM — B96.89 ACUTE BACTERIAL BRONCHITIS: Status: ACTIVE | Noted: 2017-02-18

## 2020-03-18 PROBLEM — R49.0 HOARSENESS: Status: ACTIVE | Noted: 2017-11-29
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
## 2020-03-18 PROBLEM — R49.0 HOARSENESS: Status: RESOLVED | Noted: 2017-11-29 | Resolved: 2020-03-18

## 2020-03-18 PROBLEM — J02.9 SORE THROAT: Status: ACTIVE | Noted: 2018-08-30

## 2020-03-18 PROBLEM — L03.90 CELLULITIS: Status: RESOLVED | Noted: 2017-10-03 | Resolved: 2020-03-18

## 2020-03-18 PROBLEM — R03.0 ELEVATED BLOOD PRESSURE READING WITHOUT DIAGNOSIS OF HYPERTENSION: Status: ACTIVE | Noted: 2017-11-29
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
## 2020-03-18 PROBLEM — B96.89 ACUTE BACTERIAL BRONCHITIS: Status: RESOLVED | Noted: 2017-02-18 | Resolved: 2020-03-18

## 2020-03-18 PROBLEM — J02.9 SORE THROAT: Status: RESOLVED | Noted: 2018-08-30 | Resolved: 2020-03-18

## 2020-03-18 PROBLEM — L03.90 CELLULITIS: Status: ACTIVE | Noted: 2017-10-03

## 2020-03-18 PROBLEM — J01.00 SINUSITIS, ACUTE MAXILLARY: Status: ACTIVE | Noted: 2017-02-18

## 2020-03-18 PROBLEM — R03.0 ELEVATED BLOOD PRESSURE READING WITHOUT DIAGNOSIS OF HYPERTENSION: Status: RESOLVED | Noted: 2017-11-29 | Resolved: 2020-03-18

## 2020-03-18 PROBLEM — J06.9 VIRAL URI: Status: ACTIVE | Noted: 2017-11-29

## 2020-03-18 PROBLEM — J01.00 SINUSITIS, ACUTE MAXILLARY: Status: RESOLVED | Noted: 2017-02-18 | Resolved: 2020-03-18

## 2020-03-18 PROBLEM — J06.9 VIRAL URI: Status: RESOLVED | Noted: 2017-11-29 | Resolved: 2020-03-18

## 2020-03-18 PROBLEM — J20.8 ACUTE BACTERIAL BRONCHITIS: Status: RESOLVED | Noted: 2017-02-18 | Resolved: 2020-03-18

## 2020-03-18 PROCEDURE — 99214 OFFICE O/P EST MOD 30 MIN: CPT | Performed by: FAMILY MEDICINE

## 2020-03-18 PROCEDURE — 99396 PREV VISIT EST AGE 40-64: CPT | Performed by: FAMILY MEDICINE

## 2020-03-18 RX ORDER — CONJUGATED ESTROGENS/BAZEDOXIFENE .45; 2 MG/1; MG/1
TABLET, FILM COATED ORAL
COMMUNITY
Start: 2020-03-09 | End: 2021-03-18 | Stop reason: ALTCHOICE

## 2020-03-18 RX ORDER — TELMISARTAN 80 MG/1
80 TABLET ORAL DAILY
Qty: 90 TABLET | Refills: 3 | Status: SHIPPED | OUTPATIENT
Start: 2020-03-18 | End: 2021-03-01

## 2020-03-18 NOTE — ASSESSMENT & PLAN NOTE
Hypertension is unchanged.  Continue current treatment regimen.  Blood pressure will be reassessed at the next regular appointment.  Patient notes that her blood pressure is spiking in mid afternoon.  She is drinking some caffeine 3-4 times daily and will try eliminating that.  If her intermittent blood pressure control is still not stabilized within the next 2 months, she will call.

## 2020-03-18 NOTE — PROGRESS NOTES
"   Subjective       Chief Complaint   Patient presents with   • Annual Exam   • Hypertension   • Allergies   • Heartburn   • Hyperlipidemia         HPI:       Radha Higgins is a 54 y.o. female who presents to the office today for annual well visit and also to refill medicines.  Blood pressure control.  GERD symptoms stable with over-the-counter proton pump inhibitor.  Cholesterol is stable.  Please see 10-year CVD risk below.  Medicine not indicated at this time.  Medication list updated.  Overall she feels well.  Preventative measures discussed during today's visit.  Immunizations up-to-date.  Screening testing up-to-date.  Allergies well treated with over-the-counter therapies.    I have reviewed and updated her medications, medical history and problem list during today's office visit.   The 10-year CVD risk score (SEGUN'Agoino, et al., 2008) is: 5%    Values used to calculate the score:      Age: 54 years      Sex: Female      Diabetic: No      Tobacco smoker: No      Systolic Blood Pressure: 122 mmHg      Is BP treated: Yes      HDL Cholesterol: 89 mg/dL      Total Cholesterol: 206 mg/dL  Social History     Tobacco Use   • Smoking status: Never Smoker   • Smokeless tobacco: Never Used   Substance Use Topics   • Alcohol use: No       Review of Systems   Constitutional: Negative for fatigue.   HENT: Negative.    Eyes: Negative.    Respiratory: Negative.    Cardiovascular: Negative.  Negative for chest pain.   Gastrointestinal: Negative.    Endocrine: Negative for cold intolerance and heat intolerance.   Genitourinary: Negative.    Musculoskeletal: Negative.    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   Neurological: Negative.    Hematological: Negative.    Psychiatric/Behavioral: Negative.    All other systems reviewed and are negative.        PE:   Objective   /80   Pulse 75   Temp 98 °F (36.7 °C) (Oral)   Resp 16   Ht 154.9 cm (61\")   Wt 63 kg (139 lb)   SpO2 95%   " BMI 26.26 kg/m²     Body mass index is 26.26 kg/m².    Physical Exam   Constitutional: She is oriented to person, place, and time. Vital signs are normal. She appears well-developed. No distress.   HENT:   Head: Normocephalic and atraumatic.   Right Ear: Hearing and tympanic membrane normal.   Left Ear: Hearing and tympanic membrane normal.   Nose: Nose normal.   Mouth/Throat: Uvula is midline, oropharynx is clear and moist and mucous membranes are normal.   Eyes: Pupils are equal, round, and reactive to light. Conjunctivae, EOM and lids are normal.   Neck: Trachea normal and phonation normal. No JVD present. Carotid bruit is not present. No thyroid mass and no thyromegaly present.   Cardiovascular: Normal rate, regular rhythm and normal heart sounds.   Pulmonary/Chest: Effort normal and breath sounds normal.   Abdominal: Soft. Normal appearance and bowel sounds are normal. There is no hepatosplenomegaly. There is no tenderness.   Musculoskeletal: Normal range of motion.        Lumbar back: She exhibits no deformity.   Lymphadenopathy:     She has no cervical adenopathy.        Right: No supraclavicular adenopathy present.        Left: No supraclavicular adenopathy present.   Neurological: She is alert and oriented to person, place, and time. She has normal strength. No cranial nerve deficit.   Reflex Scores:       Patellar reflexes are 2+ on the right side and 2+ on the left side.  Skin: Skin is warm and dry. No rash noted.   Psychiatric: She has a normal mood and affect. Her speech is normal and behavior is normal. Judgment and thought content normal. Cognition and memory are normal. She is attentive.        Data Reviewed:              Lab Results   Component Value Date    GLU 96 03/11/2020    BUN 17 03/11/2020    CREATININE 0.76 03/11/2020    EGFRIFNONA 88 03/11/2020    EGFRIFAFRI 101 03/11/2020     03/11/2020    K 4.5 03/11/2020     03/11/2020    CALCIUM 9.8 03/11/2020    ALBUMIN 4.7 03/11/2020     BILITOT 0.5 03/11/2020    ALKPHOS 82 03/11/2020    AST 30 03/11/2020    ALT 25 03/11/2020    CHLPL 206 (H) 03/11/2020    TRIG 79 03/11/2020    HDL 89 03/11/2020    VLDL 16 03/11/2020     (H) 03/11/2020    WBC 6.4 03/11/2020    RBC 4.22 03/11/2020    HCT 39.5 03/11/2020    MCV 94 03/11/2020    MCH 30.6 03/11/2020          A/P:     Assessment/Plan   Diagnoses and all orders for this visit:    1. Encounter for wellness examination in adult (Primary)  Comments:  Preventative measures are up-to-date.  Orders:  -     Comprehensive Metabolic Panel; Future  -     CBC & Differential; Future  -     Lipid Panel With / Chol / HDL Ratio; Future    2. Essential hypertension  Assessment & Plan:  Hypertension is unchanged.  Continue current treatment regimen.  Blood pressure will be reassessed at the next regular appointment.  Patient notes that her blood pressure is spiking in mid afternoon.  She is drinking some caffeine 3-4 times daily and will try eliminating that.  If her intermittent blood pressure control is still not stabilized within the next 2 months, she will call.    Orders:  -     telmisartan (MICARDIS) 80 MG tablet; Take 1 tablet by mouth Daily.  Dispense: 90 tablet; Refill: 3  -     Comprehensive Metabolic Panel; Future  -     CBC & Differential; Future  -     TSH; Future    3. Pure hypercholesterolemia  Assessment & Plan:  Lipid abnormalities are unchanged.  Nutritional counseling was provided.  Lipids will be reassessed in 1 year.    Orders:  -     Lipid Panel With / Chol / HDL Ratio; Future  -     CK; Future    4. Non-seasonal allergic rhinitis due to pollen  Assessment & Plan:  Condition stable on current over-the-counter therapy.      5. Gastroesophageal reflux disease without esophagitis  Assessment & Plan:  Condition is controlled on current proton pump inhibitor therapy.  She may try to wean off of it.  She can consider other medications like Pepcid AC which have a lower risk profile.      6. Screening  for ischemic heart disease  -     Comprehensive Metabolic Panel; Future  -     CBC & Differential; Future  -     Lipid Panel With / Chol / HDL Ratio; Future        Follow up:    Return in about 1 year (around 3/18/2021) for Adult Wellness Visit, 30 minute visit, Recheck/Medication  Refill.

## 2020-03-18 NOTE — PATIENT INSTRUCTIONS
"Fat and Cholesterol Restricted Diet  Getting too much fat and cholesterol in your diet may cause health problems. Following this diet helps keep your fat and cholesterol at normal levels. This can keep you from getting sick.  WHAT TYPES OF FAT SHOULD I CHOOSE?  · Choose monosaturated and polyunsaturated fats. These are found in foods such as olive oil, canola oil, flaxseeds, walnuts, almonds, and seeds.  · Eat more omega-3 fats. Good choices include salmon, mackerel, sardines, tuna, flaxseed oil, and ground flaxseeds.  · Limit saturated fats. These are in animal products such as meats, butter, and cream. They can also be in plant products such as palm oil, palm kernel oil, and coconut oil.  ·   ·   · Avoid foods with partially hydrogenated oils in them. These contain trans fats. Examples of foods that have trans fats are stick margarine, some tub margarines, cookies, crackers, and other baked goods.  WHAT GENERAL GUIDELINES DO I NEED TO FOLLOW?    · Check food labels. Look for the words \"trans fat\" and \"saturated fat.\"  · When preparing a meal:  ¨ Fill half of your plate with vegetables and green salads.  ¨ Fill one fourth of your plate with whole grains. Look for the word \"whole\" as the first word in the ingredient list.  ¨ Fill one fourth of your plate with lean protein foods.  · Limit fruit to two servings a day. Choose fruit instead of juice.  · Eat more foods with soluble fiber. Examples of foods with this type of fiber are apples, broccoli, carrots, beans, peas, and barley. Try to get 20-30 g (grams) of fiber per day.  · Eat more home-cooked foods. Eat less at restaurants and buffets.  · Limit or avoid alcohol.  · Limit foods high in starch and sugar.  · Limit fried foods.  · Cook foods without frying them. Baking, boiling, grilling, and broiling are all great options.  · Lose weight if you are overweight. Losing even a small amount of weight can help your overall health. It can also help prevent diseases such " as diabetes and heart disease.  WHAT FOODS CAN I EAT?  Grains  Whole grains, such as whole wheat or whole grain breads, crackers, cereals, and pasta. Unsweetened oatmeal, bulgur, barley, quinoa, or brown rice. Corn or whole wheat flour tortillas.  Vegetables  Fresh or frozen vegetables (raw, steamed, roasted, or grilled). Green salads.  Fruits  All fresh, canned (in natural juice), or frozen fruits.  Meat and Other Protein Products  Ground beef (85% or leaner), grass-fed beef, or beef trimmed of fat. Skinless chicken or turkey. Ground chicken or turkey. Pork trimmed of fat. All fish and seafood. Eggs. Dried beans, peas, or lentils. Unsalted nuts or seeds. Unsalted canned or dry beans.  Dairy  Low-fat dairy products, such as skim or 1% milk, 2% or reduced-fat cheeses, low-fat ricotta or cottage cheese, or plain low-fat yogurt.  Fats and Oils  Tub margarines without trans fats. Light or reduced-fat mayonnaise and salad dressings. Avocado. Olive, canola, sesame, or safflower oils. Natural peanut or almond butter (choose ones without added sugar and oil).  The items listed above may not be a complete list of recommended foods or beverages. Contact your dietitian for more options.  WHAT FOODS ARE NOT RECOMMENDED?  Grains  White bread. White pasta. White rice. Cornbread. Bagels, pastries, and croissants. Crackers that contain trans fat.  Vegetables  White potatoes. Corn. Creamed or fried vegetables. Vegetables in a cheese sauce.  Fruits  Dried fruits. Canned fruit in light or heavy syrup. Fruit juice.  Meat and Other Protein Products  Fatty cuts of meat. Ribs, chicken wings, vega, sausage, bologna, salami, chitterlings, fatback, hot dogs, bratwurst, and packaged luncheon meats. Liver and organ meats.  Dairy  Whole or 2% milk, cream, half-and-half, and cream cheese. Whole milk cheeses. Whole-fat or sweetened yogurt. Full-fat cheeses. Nondairy creamers and whipped toppings. Processed cheese, cheese spreads, or cheese  curds.  Sweets and Desserts  Corn syrup, sugars, honey, and molasses. Candy. Jam and jelly. Syrup. Sweetened cereals. Cookies, pies, cakes, donuts, muffins, and ice cream.  Fats and Oils  Butter, stick margarine, lard, shortening, ghee, or vega fat. Coconut, palm kernel, or palm oils.  Beverages  Alcohol. Sweetened drinks (such as sodas, lemonade, and fruit drinks or punches).  The items listed above may not be a complete list of foods and beverages to avoid. Contact your dietitian for more information.  Document Released: 06/18/2013 Document Revised: 08/21/2015 Document Reviewed: 03/19/2015  [employer]® Patient Information ©2015 [employer]. This information is not intended to replace advice given to you by your health care provider. Make sure you discuss any questions you have with your health care provider.    Exercising to Stay Healthy  To become healthy and stay healthy, it is recommended that you do moderate-intensity and vigorous-intensity exercise. You can tell that you are exercising at a moderate intensity if your heart starts beating faster and you start breathing faster but can still hold a conversation. You can tell that you are exercising at a vigorous intensity if you are breathing much harder and faster and cannot hold a conversation while exercising.  Exercising regularly is important. It has many health benefits, such as:  · Improving overall fitness, flexibility, and endurance.  · Increasing bone density.  · Helping with weight control.  · Decreasing body fat.  · Increasing muscle strength.  · Reducing stress and tension.  · Improving overall health.  How often should I exercise?  Choose an activity that you enjoy, and set realistic goals. Your health care provider can help you make an activity plan that works for you.  Exercise regularly as told by your health care provider. This may include:  · Doing strength training two times a week, such as:  ? Lifting weights.  ? Using resistance  bands.  ? Push-ups.  ? Sit-ups.  ? Yoga.  · Doing a certain intensity of exercise for a given amount of time. Choose from these options:  ? A total of 150 minutes of moderate-intensity exercise every week.  ? A total of 75 minutes of vigorous-intensity exercise every week.  ? A mix of moderate-intensity and vigorous-intensity exercise every week.  Children, pregnant women, people who have not exercised regularly, people who are overweight, and older adults may need to talk with a health care provider about what activities are safe to do. If you have a medical condition, be sure to talk with your health care provider before you start a new exercise program.  What are some exercise ideas?  Moderate-intensity exercise ideas include:  · Walking 1 mile (1.6 km) in about 15 minutes.  · Biking.  · Hiking.  · Golfing.  · Dancing.  · Water aerobics.  Vigorous-intensity exercise ideas include:  · Walking 4.5 miles (7.2 km) or more in about 1 hour.  · Jogging or running 5 miles (8 km) in about 1 hour.  · Biking 10 miles (16.1 km) or more in about 1 hour.  · Lap swimming.  · Roller-skating or in-line skating.  · Cross-country skiing.  · Vigorous competitive sports, such as football, basketball, and soccer.  · Jumping rope.  · Aerobic dancing.  What are some everyday activities that can help me to get exercise?  · Yard work, such as:  ? Pushing a .  ? Raking and bagging leaves.  · Washing your car.  · Pushing a stroller.  · Shoveling snow.  · Gardening.  · Washing windows or floors.  How can I be more active in my day-to-day activities?  · Use stairs instead of an elevator.  · Take a walk during your lunch break.  · If you drive, park your car farther away from your work or school.  · If you take public transportation, get off one stop early and walk the rest of the way.  · Stand up or walk around during all of your indoor phone calls.  · Get up, stretch, and walk around every 30 minutes throughout the day.  · Enjoy  exercise with a friend. Support to continue exercising will help you keep a regular routine of activity.  What guidelines can I follow while exercising?  · Before you start a new exercise program, talk with your health care provider.  · Do not exercise so much that you hurt yourself, feel dizzy, or get very short of breath.  · Wear comfortable clothes and wear shoes with good support.  · Drink plenty of water while you exercise to prevent dehydration or heat stroke.  · Work out until your breathing and your heartbeat get faster.  Where to find more information  · [place] Department of Health and Human Services: www.hhs.gov  · Centers for Disease Control and Prevention (CDC): www.cdc.gov  Summary  · Exercising regularly is important. It will improve your overall fitness, flexibility, and endurance.  · Regular exercise also will improve your overall health. It can help you control your weight, reduce stress, and improve your bone density.  · Do not exercise so much that you hurt yourself, feel dizzy, or get very short of breath.  · Before you start a new exercise program, talk with your health care provider.  This information is not intended to replace advice given to you by your health care provider. Make sure you discuss any questions you have with your health care provider.  Document Released: 01/20/2012 Document Revised: 11/08/2018 Document Reviewed: 11/08/2018  [employer] Interactive Patient Education © 2020 [employer]    Annual flu shot has been recommended to patient. Optimal timing of this vaccination is in mid October of each year.

## 2020-03-18 NOTE — ASSESSMENT & PLAN NOTE
Condition is controlled on current proton pump inhibitor therapy.  She may try to wean off of it.  She can consider other medications like Pepcid AC which have a lower risk profile.

## 2020-06-21 PROCEDURE — U0003 INFECTIOUS AGENT DETECTION BY NUCLEIC ACID (DNA OR RNA); SEVERE ACUTE RESPIRATORY SYNDROME CORONAVIRUS 2 (SARS-COV-2) (CORONAVIRUS DISEASE [COVID-19]), AMPLIFIED PROBE TECHNIQUE, MAKING USE OF HIGH THROUGHPUT TECHNOLOGIES AS DESCRIBED BY CMS-2020-01-R: HCPCS | Performed by: NURSE PRACTITIONER

## 2021-02-28 DIAGNOSIS — I10 ESSENTIAL HYPERTENSION: ICD-10-CM

## 2021-03-01 RX ORDER — TELMISARTAN 80 MG/1
TABLET ORAL
Qty: 90 TABLET | Refills: 0 | Status: SHIPPED | OUTPATIENT
Start: 2021-03-01 | End: 2021-03-18 | Stop reason: ALTCHOICE

## 2021-03-12 ENCOUNTER — OUTSIDE FACILITY SERVICE (OUTPATIENT)
Dept: FAMILY MEDICINE CLINIC | Facility: CLINIC | Age: 56
End: 2021-03-12

## 2021-03-12 PROCEDURE — OUTSIDEPOS PR OUTSIDE POS PLACEHOLDER: Performed by: FAMILY MEDICINE

## 2021-03-18 ENCOUNTER — OFFICE VISIT (OUTPATIENT)
Dept: FAMILY MEDICINE CLINIC | Facility: CLINIC | Age: 56
End: 2021-03-18

## 2021-03-18 VITALS
HEIGHT: 60 IN | TEMPERATURE: 96.9 F | HEART RATE: 67 BPM | DIASTOLIC BLOOD PRESSURE: 86 MMHG | SYSTOLIC BLOOD PRESSURE: 144 MMHG | RESPIRATION RATE: 16 BRPM | OXYGEN SATURATION: 93 % | BODY MASS INDEX: 28.27 KG/M2 | WEIGHT: 144 LBS

## 2021-03-18 DIAGNOSIS — I10 ESSENTIAL HYPERTENSION: ICD-10-CM

## 2021-03-18 DIAGNOSIS — E66.3 OVERWEIGHT WITH BODY MASS INDEX (BMI) OF 28 TO 28.9 IN ADULT: ICD-10-CM

## 2021-03-18 DIAGNOSIS — K21.9 GASTROESOPHAGEAL REFLUX DISEASE WITHOUT ESOPHAGITIS: ICD-10-CM

## 2021-03-18 DIAGNOSIS — E78.00 PURE HYPERCHOLESTEROLEMIA: ICD-10-CM

## 2021-03-18 DIAGNOSIS — Z00.00 ENCOUNTER FOR WELLNESS EXAMINATION IN ADULT: Primary | ICD-10-CM

## 2021-03-18 DIAGNOSIS — J30.1 NON-SEASONAL ALLERGIC RHINITIS DUE TO POLLEN: ICD-10-CM

## 2021-03-18 PROCEDURE — 99214 OFFICE O/P EST MOD 30 MIN: CPT | Performed by: FAMILY MEDICINE

## 2021-03-18 RX ORDER — LANSOPRAZOLE 15 MG/1
15 CAPSULE, DELAYED RELEASE ORAL
COMMUNITY

## 2021-03-18 RX ORDER — TELMISARTAN AND HYDROCHLORTHIAZIDE 80; 12.5 MG/1; MG/1
1 TABLET ORAL NIGHTLY
Qty: 90 TABLET | Refills: 1 | Status: SHIPPED | OUTPATIENT
Start: 2021-03-18 | End: 2021-06-17 | Stop reason: SDUPTHER

## 2021-03-18 RX ORDER — ROSUVASTATIN CALCIUM 5 MG/1
5 TABLET, COATED ORAL NIGHTLY
Qty: 90 TABLET | Refills: 1 | Status: SHIPPED | OUTPATIENT
Start: 2021-03-18 | End: 2021-06-17 | Stop reason: SDUPTHER

## 2021-03-18 RX ORDER — NORETHINDRONE AND ETHINYL ESTRADIOL 1 MG-35MCG
KIT ORAL
COMMUNITY
Start: 2021-02-03 | End: 2022-11-30

## 2021-03-18 NOTE — ASSESSMENT & PLAN NOTE
Hypertension is worsening.  Dietary sodium restriction.  Weight loss.  Regular aerobic exercise.  Medication changes per orders.  Blood pressure will be reassessed in 3 months.

## 2021-03-18 NOTE — ASSESSMENT & PLAN NOTE
Lipid abnormalities are worsening.  Nutritional counseling was provided. and Pharmacotherapy as ordered.  Add rosuvastatin 5 mg to at bedtime dosing medicine schedule.  Lipids will be reassessed in 3 months.

## 2021-03-18 NOTE — PROGRESS NOTES
"Chief Complaint  Annual Exam    Subjective      History of Present Illness      Radha Higgins presents to [facility] for annual wellness visit and also to refill medicines.  Blood pressure is not controlled on current therapy.  She is taking the medicine during the day and has several readings where the diastolic is above 90 and the systolic is above 140.  She denies headaches or chest pain.  Cholesterol numbers have worsened as well.  She has high HDL cholesterol and also high LDL cholesterol.  Cardiovascular risk is calculated and seen below.  Allergies are stable.  GERD symptoms are controlled with current over-the-counter lansoprazole.  Preventative measures have been reviewed in detail during today's annual wellness visit.  The 10-year CVD risk score (Jenn, et al., 2008) is: 10.7%    Values used to calculate the score:      Age: 55 years      Sex: Female      Diabetic: No      Tobacco smoker: No      Systolic Blood Pressure: 144 mmHg      Is BP treated: Yes      HDL Cholesterol: 77 mg/dL      Total Cholesterol: 238 mg/dL    Review of Systems   Constitutional: Negative.    HENT: Positive for congestion.    Eyes: Negative.    Respiratory: Negative.    Cardiovascular: Negative.    Gastrointestinal: Negative.    Endocrine: Positive for cold intolerance (due to raynauds). Negative for heat intolerance.   Genitourinary: Negative.    Musculoskeletal: Positive for arthralgias.   Skin: Negative.    Allergic/Immunologic: Positive for environmental allergies.   Neurological: Positive for headache (once a month, due to cycle).   Hematological: Does not bruise/bleed easily.   Psychiatric/Behavioral: Positive for stress (work). Negative for depressed mood. The patient is not nervous/anxious.    All other systems reviewed and are negative.            Objective     Vital Signs:   /86   Pulse 67   Temp 96.9 °F (36.1 °C) (Tympanic)   Resp 16   Ht 152.4 cm (60\")   Wt 65.3 kg (144 lb) "   SpO2 93%   BMI 28.12 kg/m²       Physical Exam  Vitals reviewed.   Constitutional:       Appearance: Normal appearance. She is well-developed and overweight.   HENT:      Head: Normocephalic.      Right Ear: Hearing, tympanic membrane and external ear normal.      Left Ear: Hearing, tympanic membrane and external ear normal.   Eyes:      General: Lids are normal.      Conjunctiva/sclera: Conjunctivae normal.      Pupils: Pupils are equal, round, and reactive to light.      Funduscopic exam:     Right eye: No AV nicking or papilledema.         Left eye: No AV nicking or papilledema.   Neck:      Thyroid: No thyroid mass or thyromegaly.      Vascular: No carotid bruit or JVD.      Trachea: Trachea normal.   Cardiovascular:      Rate and Rhythm: Normal rate and regular rhythm.      Pulses: Normal pulses.      Heart sounds: Normal heart sounds. No murmur heard.     Pulmonary:      Effort: Pulmonary effort is normal.      Breath sounds: Normal breath sounds.   Chest:       Abdominal:      General: Bowel sounds are normal.      Palpations: Abdomen is soft.      Tenderness: There is no abdominal tenderness.   Musculoskeletal:         General: Normal range of motion.      Cervical back: Normal range of motion and neck supple.      Lumbar back: No bony tenderness.   Lymphadenopathy:      Cervical: No cervical adenopathy.      Upper Body:      Right upper body: No supraclavicular adenopathy.      Left upper body: No supraclavicular adenopathy.   Skin:     General: Skin is warm and dry.      Findings: No rash.      Nails: There is no clubbing.   Neurological:      Mental Status: She is alert and oriented to person, place, and time.      Cranial Nerves: No cranial nerve deficit.      Deep Tendon Reflexes:      Reflex Scores:       Patellar reflexes are 2+ on the right side and 2+ on the left side.  Psychiatric:         Speech: Speech normal.         Behavior: Behavior normal.         Thought Content: Thought content normal.          Judgment: Judgment normal.              Result Review :                            Assessment/Plan     Assessment and Plan      Diagnoses and all orders for this visit:    1. Encounter for wellness examination in adult (Primary)  Comments:  Information on diet and exercise (150 minutes a week). Keep appt with gynecology.     2. Essential hypertension  Assessment & Plan:  Hypertension is worsening.  Dietary sodium restriction.  Weight loss.  Regular aerobic exercise.  Medication changes per orders.  Blood pressure will be reassessed in 3 months.    Orders:  -     telmisartan-hydrochlorothiazide (MICARDIS HCT) 80-12.5 MG per tablet; Take 1 tablet by mouth Every Night for 180 days.  Dispense: 90 tablet; Refill: 1  -     CBC & Differential; Future  -     Comprehensive Metabolic Panel; Future    3. Pure hypercholesterolemia  Assessment & Plan:  Lipid abnormalities are worsening.  Nutritional counseling was provided. and Pharmacotherapy as ordered.  Add rosuvastatin 5 mg to at bedtime dosing medicine schedule.  Lipids will be reassessed in 3 months.    Orders:  -     NMR LipoProfile; Future  -     CK; Future  -     rosuvastatin (CRESTOR) 5 MG tablet; Take 1 tablet by mouth Every Night for 180 days.  Dispense: 90 tablet; Refill: 1    4. Non-seasonal allergic rhinitis due to pollen  Assessment & Plan:  The current medical regimen is effective;  continue present plan and medications.        5. Gastroesophageal reflux disease without esophagitis  Assessment & Plan:  The current medical regimen is effective;  continue present plan and medications.        6. Overweight with body mass index (BMI) of 28 to 28.9 in adult  Assessment & Plan:  Patient's (Body mass index is 28.12 kg/m².) indicates that they are overweight (BMI 25-29.9) with obesity-related health conditions that include hypertension . Obesity is unchanged. BMI is is above average; BMI management plan is completed. We discussed portion control, increasing  exercise and Weight Watchers or other Commercial based weight reduction program.             Follow Up   Return in about 3 months (around 6/18/2021).    Patient was given instructions and counseling regarding her condition or for health maintenance advice. Please see specific information pulled into the AVS if appropriate.

## 2021-03-18 NOTE — PATIENT INSTRUCTIONS
Advance Directive    Advance directives are legal documents that let you make choices ahead of time about your health care and medical treatment in case you become unable to communicate for yourself. Advance directives are a way for you to make known your wishes to family, friends, and health care providers. This can let others know about your end-of-life care if you become unable to communicate.  Discussing and writing advance directives should happen over time rather than all at once. Advance directives can be changed depending on your situation and what you want, even after you have signed the advance directives.  There are different types of advance directives, such as:  · Medical power of .  · Living will.  · Do not resuscitate (DNR) or do not attempt resuscitation (DNAR) order.  Health care proxy and medical power of   A health care proxy is also called a health care agent. This is a person who is appointed to make medical decisions for you in cases where you are unable to make the decisions yourself. Generally, people choose someone they know well and trust to represent their preferences. Make sure to ask this person for an agreement to act as your proxy. A proxy may have to exercise judgment in the event of a medical decision for which your wishes are not known.  A medical power of  is a legal document that names your health care proxy. Depending on the laws in your state, after the document is written, it may also need to be:  · Signed.  · Notarized.  · Dated.  · Copied.  · Witnessed.  · Incorporated into your medical record.  You may also want to appoint someone to manage your money in a situation in which you are unable to do so. This is called a durable power of  for finances. It is a separate legal document from the durable power of  for health care. You may choose the same person or someone different from your health care proxy to act as your agent in money  matters.  If you do not appoint a proxy, or if there is a concern that the proxy is not acting in your best interests, a court may appoint a guardian to act on your behalf.  Living will  A living will is a set of instructions that state your wishes about medical care when you cannot express them yourself. Health care providers should keep a copy of your living will in your medical record. You may want to give a copy to family members or friends. To alert caregivers in case of an emergency, you can place a card in your wallet to let them know that you have a living will and where they can find it. A living will is used if you become:  · Terminally ill.  · Disabled.  · Unable to communicate or make decisions.  Items to consider in your living will include:  · To use or not to use life-support equipment, such as dialysis machines and breathing machines (ventilators).  · A DNR or DNAR order. This tells health care providers not to use cardiopulmonary resuscitation (CPR) if breathing or heartbeat stops.  · To use or not to use tube feeding.  · To be given or not to be given food and fluids.  · Comfort (palliative) care when the goal becomes comfort rather than a cure.  · Donation of organs and tissues.  A living will does not give instructions for distributing your money and property if you should pass away.  DNR or DNAR  A DNR or DNAR order is a request not to have CPR in the event that your heart stops beating or you stop breathing. If a DNR or DNAR order has not been made and shared, a health care provider will try to help any patient whose heart has stopped or who has stopped breathing. If you plan to have surgery, talk with your health care provider about how your DNR or DNAR order will be followed if problems occur.  What if I do not have an advance directive?  If you do not have an advance directive, some states assign family decision makers to act on your behalf based on how closely you are related to them. Each  "state has its own laws about advance directives. You may want to check with your health care provider, , or state representative about the laws in your state.  Summary  · Advance directives are the legal documents that allow you to make choices ahead of time about your health care and medical treatment in case you become unable to tell others about your care.  · The process of discussing and writing advance directives should happen over time. You can change the advance directives, even after you have signed them.  · Advance directives include DNR or DNAR orders, living king, and designating an agent as your medical power of .  This information is not intended to replace advice given to you by your health care provider. Make sure you discuss any questions you have with your health care provider.  Document Revised: 01/28/2021 Document Reviewed: 07/16/2020  [employer] Patient Education © 2021 [employer]    Annual flu shot has been recommended to patient. Optimal timing of this vaccination is in mid October of each year.     https://www.nhlbi.nih.gov/files/docs/public/heart/dash_brief.pdf\">   DASH Eating Plan  DASH stands for Dietary Approaches to Stop Hypertension. The DASH eating plan is a healthy eating plan that has been shown to:  · Reduce high blood pressure (hypertension).  · Reduce your risk for type 2 diabetes, heart disease, and stroke.  · Help with weight loss.  What are tips for following this plan?  Reading food labels  · Check food labels for the amount of salt (sodium) per serving. Choose foods with less than 5 percent of the Daily Value of sodium. Generally, foods with less than 300 milligrams (mg) of sodium per serving fit into this eating plan.  · To find whole grains, look for the word \"whole\" as the first word in the ingredient list.  Shopping  · Buy products labeled as \"low-sodium\" or \"no salt added.\"  · Buy fresh foods. Avoid canned foods and pre-made or frozen " meals.  Cooking  · Avoid adding salt when cooking. Use salt-free seasonings or herbs instead of table salt or sea salt. Check with your health care provider or pharmacist before using salt substitutes.  · Do not ross foods. Cook foods using healthy methods such as baking, boiling, grilling, roasting, and broiling instead.  · Cook with heart-healthy oils, such as olive, canola, avocado, soybean, or sunflower oil.  Meal planning    · Eat a balanced diet that includes:  ? 4 or more servings of fruits and 4 or more servings of vegetables each day. Try to fill one-half of your plate with fruits and vegetables.  ? 6-8 servings of whole grains each day.  ? Less than 6 oz (170 g) of lean meat, poultry, or fish each day. A 3-oz (85-g) serving of meat is about the same size as a deck of cards. One egg equals 1 oz (28 g).  ? 2-3 servings of low-fat dairy each day. One serving is 1 cup (237 mL).  ? 1 serving of nuts, seeds, or beans 5 times each week.  ? 2-3 servings of heart-healthy fats. Healthy fats called omega-3 fatty acids are found in foods such as walnuts, flaxseeds, fortified milks, and eggs. These fats are also found in cold-water fish, such as sardines, salmon, and mackerel.  · Limit how much you eat of:  ? Canned or prepackaged foods.  ? Food that is high in trans fat, such as some fried foods.  ? Food that is high in saturated fat, such as fatty meat.  ? Desserts and other sweets, sugary drinks, and other foods with added sugar.  ? Full-fat dairy products.  · Do not salt foods before eating.  · Do not eat more than 4 egg yolks a week.  · Try to eat at least 2 vegetarian meals a week.  · Eat more home-cooked food and less restaurant, buffet, and fast food.  Lifestyle  · When eating at a restaurant, ask that your food be prepared with less salt or no salt, if possible.  · If you drink alcohol:  ? Limit how much you use to:  § 0-1 drink a day for women who are not pregnant.  § 0-2 drinks a day for men.  ? Be aware of  how much alcohol is in your drink. In the [place] one drink equals one 12 oz bottle of beer (355 mL), one 5 oz glass of wine (148 mL), or one 1½ oz glass of hard liquor (44 mL).  General information  · Avoid eating more than 2,300 mg of salt a day. If you have hypertension, you may need to reduce your sodium intake to 1,500 mg a day.  · Work with your health care provider to maintain a healthy body weight or to lose weight. Ask what an ideal weight is for you.  · Get at least 30 minutes of exercise that causes your heart to beat faster (aerobic exercise) most days of the week. Activities may include walking, swimming, or biking.  · Work with your health care provider or dietitian to adjust your eating plan to your individual calorie needs.  What foods should I eat?  Fruits  All fresh, dried, or frozen fruit. Canned fruit in natural juice (without added sugar).  Vegetables  Fresh or frozen vegetables (raw, steamed, roasted, or grilled). Low-sodium or reduced-sodium tomato and vegetable juice. Low-sodium or reduced-sodium tomato sauce and tomato paste. Low-sodium or reduced-sodium canned vegetables.  Grains  Whole-grain or whole-wheat bread. Whole-grain or whole-wheat pasta. Brown rice. Oatmeal. Quinoa. Bulgur. Whole-grain and low-sodium cereals. Kristin bread. Low-fat, low-sodium crackers. Whole-wheat flour tortillas.  Meats and other proteins  Skinless chicken or turkey. Ground chicken or turkey. Pork with fat trimmed off. Fish and seafood. Egg whites. Dried beans, peas, or lentils. Unsalted nuts, nut butters, and seeds. Unsalted canned beans. Lean cuts of beef with fat trimmed off. Low-sodium, lean precooked or cured meat, such as sausages or meat loaves.  Dairy  Low-fat (1%) or fat-free (skim) milk. Reduced-fat, low-fat, or fat-free cheeses. Nonfat, low-sodium ricotta or cottage cheese. Low-fat or nonfat yogurt. Low-fat, low-sodium cheese.  Fats and oils  Soft margarine without trans fats. Vegetable oil. Reduced-fat,  low-fat, or light mayonnaise and salad dressings (reduced-sodium). Canola, safflower, olive, avocado, soybean, and sunflower oils. Avocado.  Seasonings and condiments  Herbs. Spices. Seasoning mixes without salt.  Other foods  Unsalted popcorn and pretzels. Fat-free sweets.  The items listed above may not be a complete list of foods and beverages you can eat. Contact a dietitian for more information.  What foods should I avoid?  Fruits  Canned fruit in a light or heavy syrup. Fried fruit. Fruit in cream or butter sauce.  Vegetables  Creamed or fried vegetables. Vegetables in a cheese sauce. Regular canned vegetables (not low-sodium or reduced-sodium). Regular canned tomato sauce and paste (not low-sodium or reduced-sodium). Regular tomato and vegetable juice (not low-sodium or reduced-sodium). Pickles. Olives.  Grains  Baked goods made with fat, such as croissants, muffins, or some breads. Dry pasta or rice meal packs.  Meats and other proteins  Fatty cuts of meat. Ribs. Fried meat. Gardner. Bologna, salami, and other precooked or cured meats, such as sausages or meat loaves. Fat from the back of a pig (fatback). Bratwurst. Salted nuts and seeds. Canned beans with added salt. Canned or smoked fish. Whole eggs or egg yolks. Chicken or turkey with skin.  Dairy  Whole or 2% milk, cream, and half-and-half. Whole or full-fat cream cheese. Whole-fat or sweetened yogurt. Full-fat cheese. Nondairy creamers. Whipped toppings. Processed cheese and cheese spreads.  Fats and oils  Butter. Stick margarine. Lard. Shortening. Ghee. Gardner fat. Tropical oils, such as coconut, palm kernel, or palm oil.  Seasonings and condiments  Onion salt, garlic salt, seasoned salt, table salt, and sea salt. Worcestershire sauce. Tartar sauce. Barbecue sauce. Teriyaki sauce. Soy sauce, including reduced-sodium. Steak sauce. Canned and packaged gravies. Fish sauce. Oyster sauce. Cocktail sauce. Store-bought horseradish. Ketchup. Mustard. Meat  flavorings and tenderizers. Bouillon cubes. Hot sauces. Pre-made or packaged marinades. Pre-made or packaged taco seasonings. Relishes. Regular salad dressings.  Other foods  Salted popcorn and pretzels.  The items listed above may not be a complete list of foods and beverages you should avoid. Contact a dietitian for more information.  Where to find more information  · National Heart, Lung, and Blood [place]: www.nhlbi.nih.gov  · American Heart Association: www.heart.org  · Academy of Nutrition and Dietetics: www.eatright.org  · National Kidney Foundation: www.kidney.org  Summary  · The DASH eating plan is a healthy eating plan that has been shown to reduce high blood pressure (hypertension). It may also reduce your risk for type 2 diabetes, heart disease, and stroke.  · When on the DASH eating plan, aim to eat more fresh fruits and vegetables, whole grains, lean proteins, low-fat dairy, and heart-healthy fats.  · With the DASH eating plan, you should limit salt (sodium) intake to 2,300 mg a day. If you have hypertension, you may need to reduce your sodium intake to 1,500 mg a day.  · Work with your health care provider or dietitian to adjust your eating plan to your individual calorie needs.  This information is not intended to replace advice given to you by your health care provider. Make sure you discuss any questions you have with your health care provider.  Document Revised: 11/20/2020 Document Reviewed: 11/20/2020  [employer]vier Patient Education © 2021 [employer]    Fat and Cholesterol Restricted Diet  Getting too much fat and cholesterol in your diet may cause health problems. Following this diet helps keep your fat and cholesterol at normal levels. This can keep you from getting sick.  WHAT TYPES OF FAT SHOULD I CHOOSE?  · Choose monosaturated and polyunsaturated fats. These are found in foods such as olive oil, canola oil, flaxseeds, walnuts, almonds, and seeds.  · Eat more omega-3 fats. Good choices  "include salmon, mackerel, sardines, tuna, flaxseed oil, and ground flaxseeds.  · Limit saturated fats. These are in animal products such as meats, butter, and cream. They can also be in plant products such as palm oil, palm kernel oil, and coconut oil.  ·   ·   · Avoid foods with partially hydrogenated oils in them. These contain trans fats. Examples of foods that have trans fats are stick margarine, some tub margarines, cookies, crackers, and other baked goods.  WHAT GENERAL GUIDELINES DO I NEED TO FOLLOW?    · Check food labels. Look for the words \"trans fat\" and \"saturated fat.\"  · When preparing a meal:  ¨ Fill half of your plate with vegetables and green salads.  ¨ Fill one fourth of your plate with whole grains. Look for the word \"whole\" as the first word in the ingredient list.  ¨ Fill one fourth of your plate with lean protein foods.  · Limit fruit to two servings a day. Choose fruit instead of juice.  · Eat more foods with soluble fiber. Examples of foods with this type of fiber are apples, broccoli, carrots, beans, peas, and barley. Try to get 20-30 g (grams) of fiber per day.  · Eat more home-cooked foods. Eat less at restaurants and buffets.  · Limit or avoid alcohol.  · Limit foods high in starch and sugar.  · Limit fried foods.  · Cook foods without frying them. Baking, boiling, grilling, and broiling are all great options.  · Lose weight if you are overweight. Losing even a small amount of weight can help your overall health. It can also help prevent diseases such as diabetes and heart disease.  WHAT FOODS CAN I EAT?  Grains  Whole grains, such as whole wheat or whole grain breads, crackers, cereals, and pasta. Unsweetened oatmeal, bulgur, barley, quinoa, or brown rice. Corn or whole wheat flour tortillas.  Vegetables  Fresh or frozen vegetables (raw, steamed, roasted, or grilled). Green salads.  Fruits  All fresh, canned (in natural juice), or frozen fruits.  Meat and Other Protein Products  Ground " beef (85% or leaner), grass-fed beef, or beef trimmed of fat. Skinless chicken or turkey. Ground chicken or turkey. Pork trimmed of fat. All fish and seafood. Eggs. Dried beans, peas, or lentils. Unsalted nuts or seeds. Unsalted canned or dry beans.  Dairy  Low-fat dairy products, such as skim or 1% milk, 2% or reduced-fat cheeses, low-fat ricotta or cottage cheese, or plain low-fat yogurt.  Fats and Oils  Tub margarines without trans fats. Light or reduced-fat mayonnaise and salad dressings. Avocado. Olive, canola, sesame, or safflower oils. Natural peanut or almond butter (choose ones without added sugar and oil).  The items listed above may not be a complete list of recommended foods or beverages. Contact your dietitian for more options.  WHAT FOODS ARE NOT RECOMMENDED?  Grains  White bread. White pasta. White rice. Cornbread. Bagels, pastries, and croissants. Crackers that contain trans fat.  Vegetables  White potatoes. Corn. Creamed or fried vegetables. Vegetables in a cheese sauce.  Fruits  Dried fruits. Canned fruit in light or heavy syrup. Fruit juice.  Meat and Other Protein Products  Fatty cuts of meat. Ribs, chicken wings, vega, sausage, bologna, salami, chitterlings, fatback, hot dogs, bratwurst, and packaged luncheon meats. Liver and organ meats.  Dairy  Whole or 2% milk, cream, half-and-half, and cream cheese. Whole milk cheeses. Whole-fat or sweetened yogurt. Full-fat cheeses. Nondairy creamers and whipped toppings. Processed cheese, cheese spreads, or cheese curds.  Sweets and Desserts  Corn syrup, sugars, honey, and molasses. Candy. Jam and jelly. Syrup. Sweetened cereals. Cookies, pies, cakes, donuts, muffins, and ice cream.  Fats and Oils  Butter, stick margarine, lard, shortening, ghee, or vega fat. Coconut, palm kernel, or palm oils.  Beverages  Alcohol. Sweetened drinks (such as sodas, lemonade, and fruit drinks or punches).  The items listed above may not be a complete list of foods and  beverages to avoid. Contact your dietitian for more information.  Document Released: 06/18/2013 Document Revised: 08/21/2015 Document Reviewed: 03/19/2015  [employer]® Patient Information ©2015 [employer]. This information is not intended to replace advice given to you by your health care provider. Make sure you discuss any questions you have with your health care provider.      Exercising to Stay Healthy  To become healthy and stay healthy, it is recommended that you do moderate-intensity and vigorous-intensity exercise. You can tell that you are exercising at a moderate intensity if your heart starts beating faster and you start breathing faster but can still hold a conversation. You can tell that you are exercising at a vigorous intensity if you are breathing much harder and faster and cannot hold a conversation while exercising.  Exercising regularly is important. It has many health benefits, such as:  · Improving overall fitness, flexibility, and endurance.  · Increasing bone density.  · Helping with weight control.  · Decreasing body fat.  · Increasing muscle strength.  · Reducing stress and tension.  · Improving overall health.  How often should I exercise?  Choose an activity that you enjoy, and set realistic goals. Your health care provider can help you make an activity plan that works for you.  Exercise regularly as told by your health care provider. This may include:  · Doing strength training two times a week, such as:  ? Lifting weights.  ? Using resistance bands.  ? Push-ups.  ? Sit-ups.  ? Yoga.  · Doing a certain intensity of exercise for a given amount of time. Choose from these options:  ? A total of 150 minutes of moderate-intensity exercise every week.  ? A total of 75 minutes of vigorous-intensity exercise every week.  ? A mix of moderate-intensity and vigorous-intensity exercise every week.  Children, pregnant women, people who have not exercised regularly, people who are overweight, and older  adults may need to talk with a health care provider about what activities are safe to do. If you have a medical condition, be sure to talk with your health care provider before you start a new exercise program.  What are some exercise ideas?  Moderate-intensity exercise ideas include:  · Walking 1 mile (1.6 km) in about 15 minutes.  · Biking.  · Hiking.  · Golfing.  · Dancing.  · Water aerobics.  Vigorous-intensity exercise ideas include:  · Walking 4.5 miles (7.2 km) or more in about 1 hour.  · Jogging or running 5 miles (8 km) in about 1 hour.  · Biking 10 miles (16.1 km) or more in about 1 hour.  · Lap swimming.  · Roller-skating or in-line skating.  · Cross-country skiing.  · Vigorous competitive sports, such as football, basketball, and soccer.  · Jumping rope.  · Aerobic dancing.  What are some everyday activities that can help me to get exercise?  · Yard work, such as:  ? Pushing a .  ? Raking and bagging leaves.  · Washing your car.  · Pushing a stroller.  · Shoveling snow.  · Gardening.  · Washing windows or floors.  How can I be more active in my day-to-day activities?  · Use stairs instead of an elevator.  · Take a walk during your lunch break.  · If you drive, park your car farther away from your work or school.  · If you take public transportation, get off one stop early and walk the rest of the way.  · Stand up or walk around during all of your indoor phone calls.  · Get up, stretch, and walk around every 30 minutes throughout the day.  · Enjoy exercise with a friend. Support to continue exercising will help you keep a regular routine of activity.  What guidelines can I follow while exercising?  · Before you start a new exercise program, talk with your health care provider.  · Do not exercise so much that you hurt yourself, feel dizzy, or get very short of breath.  · Wear comfortable clothes and wear shoes with good support.  · Drink plenty of water while you exercise to prevent dehydration  or heat stroke.  · Work out until your breathing and your heartbeat get faster.  Where to find more information  · [place] Department of Health and Human Services: www.hhs.gov  · Centers for Disease Control and Prevention (CDC): www.cdc.gov  Summary  · Exercising regularly is important. It will improve your overall fitness, flexibility, and endurance.  · Regular exercise also will improve your overall health. It can help you control your weight, reduce stress, and improve your bone density.  · Do not exercise so much that you hurt yourself, feel dizzy, or get very short of breath.  · Before you start a new exercise program, talk with your health care provider.  This information is not intended to replace advice given to you by your health care provider. Make sure you discuss any questions you have with your health care provider.  Document Revised: 11/30/2018 Document Reviewed: 11/08/2018  Elsevier Patient Education © 2021 [employer] Inc.

## 2021-03-18 NOTE — ASSESSMENT & PLAN NOTE
Patient's (Body mass index is 28.12 kg/m².) indicates that they are overweight (BMI 25-29.9) with obesity-related health conditions that include hypertension . Obesity is unchanged. BMI is is above average; BMI management plan is completed. We discussed portion control, increasing exercise and Weight Watchers or other Commercial based weight reduction program.

## 2021-04-12 ENCOUNTER — TELEPHONE (OUTPATIENT)
Dept: FAMILY MEDICINE CLINIC | Facility: CLINIC | Age: 56
End: 2021-04-12

## 2021-04-12 DIAGNOSIS — I10 ESSENTIAL HYPERTENSION: ICD-10-CM

## 2021-04-12 DIAGNOSIS — E78.00 PURE HYPERCHOLESTEROLEMIA: ICD-10-CM

## 2021-04-12 RX ORDER — TELMISARTAN AND HYDROCHLORTHIAZIDE 80; 12.5 MG/1; MG/1
1 TABLET ORAL NIGHTLY
Qty: 90 TABLET | Refills: 1 | Status: CANCELLED | OUTPATIENT
Start: 2021-04-12 | End: 2021-10-09

## 2021-04-12 RX ORDER — ROSUVASTATIN CALCIUM 5 MG/1
5 TABLET, COATED ORAL NIGHTLY
Qty: 90 TABLET | Refills: 1 | Status: CANCELLED | OUTPATIENT
Start: 2021-04-12 | End: 2021-10-09

## 2021-05-29 DIAGNOSIS — I10 ESSENTIAL HYPERTENSION: ICD-10-CM

## 2021-06-02 RX ORDER — TELMISARTAN 80 MG/1
TABLET ORAL
Qty: 90 TABLET | Refills: 0 | OUTPATIENT
Start: 2021-06-02

## 2021-06-17 ENCOUNTER — TELEPHONE (OUTPATIENT)
Dept: FAMILY MEDICINE CLINIC | Facility: CLINIC | Age: 56
End: 2021-06-17

## 2021-06-17 DIAGNOSIS — I10 ESSENTIAL HYPERTENSION: ICD-10-CM

## 2021-06-17 DIAGNOSIS — E78.00 PURE HYPERCHOLESTEROLEMIA: ICD-10-CM

## 2021-06-17 RX ORDER — ROSUVASTATIN CALCIUM 5 MG/1
5 TABLET, COATED ORAL NIGHTLY
Qty: 90 TABLET | Refills: 0 | Status: SHIPPED | OUTPATIENT
Start: 2021-06-17 | End: 2021-08-30 | Stop reason: SDUPTHER

## 2021-06-17 RX ORDER — TELMISARTAN AND HYDROCHLORTHIAZIDE 80; 12.5 MG/1; MG/1
1 TABLET ORAL NIGHTLY
Qty: 90 TABLET | Refills: 0 | Status: SHIPPED | OUTPATIENT
Start: 2021-06-17 | End: 2021-08-30 | Stop reason: DRUGHIGH

## 2021-06-17 NOTE — TELEPHONE ENCOUNTER
Caller: Radha Higgins    Relationship: Self    Best call back number: [phone]    Medication needed:   Requested Prescriptions     Pending Prescriptions Disp Refills   • telmisartan-hydrochlorothiazide (MICARDIS HCT) 80-12.5 MG per tablet 90 tablet 1     Sig: Take 1 tablet by mouth Every Night for 180 days.   • rosuvastatin (CRESTOR) 5 MG tablet 90 tablet 1     Sig: Take 1 tablet by mouth Every Night for 180 days.       When do you need the refill by:06/17    What additional details did the patient provide when requesting the medication: PATIENT HAS TO CHANGE PHARMACIES DUE TO INSURANCE.  PATIENT HAS APPROX ONE WEEK LEFT AND MAIL ORDER HAS USUALLY A 10/14 DAY LEAD TIME.    Does the patient have less than a 3 day supply:  [] Yes  [x] No    What is the patient's preferred pharmacy: [facility] - [place], [place] - [facility] - [phone] [facility] [phone] FX

## 2021-06-24 ENCOUNTER — OFFICE VISIT (OUTPATIENT)
Dept: FAMILY MEDICINE CLINIC | Facility: CLINIC | Age: 56
End: 2021-06-24

## 2021-06-24 VITALS
WEIGHT: 143 LBS | TEMPERATURE: 97.3 F | BODY MASS INDEX: 28.07 KG/M2 | OXYGEN SATURATION: 89 % | HEIGHT: 60 IN | DIASTOLIC BLOOD PRESSURE: 92 MMHG | RESPIRATION RATE: 16 BRPM | HEART RATE: 95 BPM | SYSTOLIC BLOOD PRESSURE: 132 MMHG

## 2021-06-24 DIAGNOSIS — E78.00 PURE HYPERCHOLESTEROLEMIA: ICD-10-CM

## 2021-06-24 DIAGNOSIS — I10 ESSENTIAL HYPERTENSION: Primary | ICD-10-CM

## 2021-06-24 PROCEDURE — 99214 OFFICE O/P EST MOD 30 MIN: CPT | Performed by: FAMILY MEDICINE

## 2021-06-24 RX ORDER — HYDROCHLOROTHIAZIDE 12.5 MG/1
12.5 TABLET ORAL DAILY
Qty: 90 TABLET | Refills: 0 | Status: SHIPPED | OUTPATIENT
Start: 2021-06-24 | End: 2021-08-30 | Stop reason: DRUGHIGH

## 2021-06-24 NOTE — PROGRESS NOTES
"Chief Complaint  Hypertension (3 month follow up)    Subjective          Radha presents to [facility] to review labs and follow-up on blood pressure and cholesterol treatment.  Her blood pressure is improved but still above goal.  She does have to get up to go to the bathroom once at night.  She will switch her blood pressure treatment to a.m. dosing.  Cholesterol is improved.  LDL particle numbers are greater than 1000.  LDL cholesterol has improved.          Objective   Vital Signs:   Vitals:    06/24/21 1412   BP: 132/92   Pulse: 95   Resp: 16   Temp: 97.3 °F (36.3 °C)   TempSrc: Tympanic   SpO2: (!) 89%   Weight: 64.9 kg (143 lb)   Height: 152.4 cm (60\")   PainSc: 0-No pain        Physical Exam  Vitals reviewed.   Constitutional:       General: She is not in acute distress.  Eyes:      General: Lids are normal.      Conjunctiva/sclera: Conjunctivae normal.   Neck:      Vascular: No carotid bruit.      Trachea: No tracheal deviation.   Cardiovascular:      Rate and Rhythm: Normal rate and regular rhythm.      Heart sounds: Normal heart sounds. No murmur heard.     Pulmonary:      Effort: Pulmonary effort is normal.      Breath sounds: Normal breath sounds.   Skin:     General: Skin is warm and dry.   Neurological:      Mental Status: She is alert. She is not disoriented.   Psychiatric:         Speech: Speech normal.         Behavior: Behavior normal. Behavior is cooperative.        Diabetic Foot Exam Performed  Result Review :     The following data was reviewed by: Henry Jacinto MD on 06/24/2021:  Comprehensive Metabolic Panel (06/21/2021 07:57)  CBC & Differential (06/21/2021 07:57)  CK (06/21/2021 07:57)  NMR LipoProfile (06/21/2021 07:57)           Assessment and Plan    Diagnoses and all orders for this visit:    1. Essential hypertension (Primary)  -     hydroCHLOROthiazide (HYDRODIURIL) 12.5 MG tablet; Take 1 tablet by mouth Daily for 90 days.  Dispense: 90 tablet; Refill: " 0    2. Pure hypercholesterolemia  Assessment & Plan:  Lipid abnormalities are improving with treatment.  Pharmacotherapy as ordered.  Lipids will be reassessed in our next appt.        Follow Up   Return in about 2 months (around 8/24/2021) for BP Check.  Patient was given instructions and counseling regarding her condition or for health maintenance advice. Please see specific information pulled into the AVS if appropriate.   Answers for HPI/ROS submitted by the patient on 6/23/2021  Please describe your symptoms.: Follow up visit.   High Cholesterol and high blood pressure  Have you had these symptoms before?: Yes  How long have you been having these symptoms?: Greater than 2 weeks  Please list any medications you are currently taking for this condition.: Rosuvastatin and telmisartin.  Please describe any probable cause for these symptoms. : N/a  What is the primary reason for your visit?: Other

## 2021-06-24 NOTE — ASSESSMENT & PLAN NOTE
Lipid abnormalities are improving with treatment.  Pharmacotherapy as ordered.  Lipids will be reassessed in our next appt.

## 2021-08-30 ENCOUNTER — OFFICE VISIT (OUTPATIENT)
Dept: FAMILY MEDICINE CLINIC | Facility: CLINIC | Age: 56
End: 2021-08-30

## 2021-08-30 VITALS
HEIGHT: 60 IN | SYSTOLIC BLOOD PRESSURE: 128 MMHG | WEIGHT: 145 LBS | DIASTOLIC BLOOD PRESSURE: 72 MMHG | TEMPERATURE: 98.4 F | BODY MASS INDEX: 28.47 KG/M2 | OXYGEN SATURATION: 100 % | RESPIRATION RATE: 16 BRPM

## 2021-08-30 DIAGNOSIS — E78.00 PURE HYPERCHOLESTEROLEMIA: ICD-10-CM

## 2021-08-30 DIAGNOSIS — I10 ESSENTIAL HYPERTENSION: Primary | ICD-10-CM

## 2021-08-30 PROCEDURE — 99213 OFFICE O/P EST LOW 20 MIN: CPT | Performed by: FAMILY MEDICINE

## 2021-08-30 RX ORDER — TELMISARTAN AND HYDROCHLORTHIAZIDE 80; 25 MG/1; MG/1
1 TABLET ORAL DAILY
Qty: 90 TABLET | Refills: 1 | Status: SHIPPED | OUTPATIENT
Start: 2021-08-30 | End: 2022-02-14

## 2021-08-30 RX ORDER — ROSUVASTATIN CALCIUM 5 MG/1
5 TABLET, COATED ORAL NIGHTLY
Qty: 90 TABLET | Refills: 1 | Status: SHIPPED | OUTPATIENT
Start: 2021-08-30 | End: 2022-02-28 | Stop reason: SDUPTHER

## 2021-08-30 NOTE — PROGRESS NOTES
"Chief Complaint  Hypertension (2 month follow up )    Subjective          Radha presents to [facility] to recheck blood pressure.  No medication side effects reported.  She is taking her blood pressure medicine during the day to avoid getting up to the bathroom at night.  She denies any dizziness.  Blood pressure is well controlled.          Objective   Vital Signs:   Vitals:    08/30/21 0804   BP: 128/72   Resp: 16   Temp: 98.4 °F (36.9 °C)   TempSrc: Skin   SpO2: 100%   Weight: 65.8 kg (145 lb)   Height: 152.4 cm (60\")        Physical Exam  Vitals reviewed.   Constitutional:       General: She is not in acute distress.  Eyes:      General: Lids are normal.      Conjunctiva/sclera: Conjunctivae normal.   Neck:      Vascular: No carotid bruit.      Trachea: No tracheal deviation.   Cardiovascular:      Rate and Rhythm: Normal rate and regular rhythm.      Heart sounds: Normal heart sounds. No murmur heard.     Pulmonary:      Effort: Pulmonary effort is normal.      Breath sounds: Normal breath sounds.   Skin:     General: Skin is warm and dry.   Neurological:      Mental Status: She is alert. She is not disoriented.   Psychiatric:         Speech: Speech normal.         Behavior: Behavior normal. Behavior is cooperative.          Result Review :                Assessment and Plan    Diagnoses and all orders for this visit:    1. Essential hypertension (Primary)  Assessment & Plan:  Hypertension is improving with treatment.  Continue current treatment regimen.  Blood pressure will be reassessed at the next regular appointment.    Orders:  -     telmisartan-hydrochlorothiazide (MICARDIS HCT) 80-25 MG per tablet; Take 1 tablet by mouth Daily for 180 days.  Dispense: 90 tablet; Refill: 1  -     CBC & Differential; Future  -     Comprehensive Metabolic Panel; Future    2. Pure hypercholesterolemia  Assessment & Plan:  Lipid abnormalities are unchanged.  Pharmacotherapy as ordered.  Lipids " will be reassessed in 6 months.    Orders:  -     rosuvastatin (CRESTOR) 5 MG tablet; Take 1 tablet by mouth Every Night for 180 days.  Dispense: 90 tablet; Refill: 1  -     NMR LipoProfile; Future  -     CK; Future      Follow Up   Return in about 6 months (around 2/28/2022) for recheck/refill medication.  Patient was given instructions and counseling regarding her condition or for health maintenance advice. Please see specific information pulled into the AVS if appropriate.   Answers for HPI/ROS submitted by the patient on 8/23/2021  What is the primary reason for your visit?: High Blood Pressure

## 2021-10-26 ENCOUNTER — TELEPHONE (OUTPATIENT)
Dept: FAMILY MEDICINE CLINIC | Facility: CLINIC | Age: 56
End: 2021-10-26

## 2021-10-26 NOTE — TELEPHONE ENCOUNTER
LEFT MESSAGE FOR PHARM TO RETURN CALL  Caller: [facility]      Relationship to patient: Self     Best call back number: [phone]      REF: [id number]

## 2021-10-26 NOTE — TELEPHONE ENCOUNTER
Caller: [facility]     Relationship to patient: Self    Best call back number: [phone]     REF: [id number]    Patient is needing: [facility] IS CALLING IN REGARDS TO MEDICATION  telmisartan-hydrochlorothiazide (MICARDIS HCT) 80-25 MG per tablet. THEY STATED THAT WILL BE PLACING MEDICATION ON HOLD UNTIL OFFICE CALLS DUE TO MEDICATION HAVING DUPLICATE THERAPY.

## 2022-02-13 DIAGNOSIS — I10 ESSENTIAL HYPERTENSION: ICD-10-CM

## 2022-02-14 RX ORDER — TELMISARTAN AND HYDROCHLORTHIAZIDE 80; 25 MG/1; MG/1
TABLET ORAL
Qty: 30 TABLET | Refills: 0 | Status: SHIPPED | OUTPATIENT
Start: 2022-02-14 | End: 2022-02-28 | Stop reason: SDUPTHER

## 2022-02-16 ENCOUNTER — APPOINTMENT (OUTPATIENT)
Dept: GENERAL RADIOLOGY | Facility: HOSPITAL | Age: 57
End: 2022-02-16

## 2022-02-16 ENCOUNTER — APPOINTMENT (OUTPATIENT)
Dept: CARDIOLOGY | Facility: HOSPITAL | Age: 57
End: 2022-02-16

## 2022-02-16 ENCOUNTER — HOSPITAL ENCOUNTER (EMERGENCY)
Facility: HOSPITAL | Age: 57
Discharge: HOME | End: 2022-02-16
Attending: EMERGENCY MEDICINE | Admitting: EMERGENCY MEDICINE

## 2022-02-16 VITALS
DIASTOLIC BLOOD PRESSURE: 91 MMHG | RESPIRATION RATE: 18 BRPM | OXYGEN SATURATION: 99 % | HEART RATE: 77 BPM | SYSTOLIC BLOOD PRESSURE: 126 MMHG

## 2022-02-16 DIAGNOSIS — I49.3 PVC'S (PREMATURE VENTRICULAR CONTRACTIONS): ICD-10-CM

## 2022-02-16 DIAGNOSIS — I44.7 LEFT BUNDLE BRANCH BLOCK: ICD-10-CM

## 2022-02-16 DIAGNOSIS — R00.2 HEART PALPITATIONS: Primary | ICD-10-CM

## 2022-02-16 LAB
ALBUMIN SERPL-MCNC: 5.5 G/DL (ref 3.5–5.2)
ALBUMIN/GLOB SERPL: 1.6 G/DL
ALP SERPL-CCNC: 63 U/L (ref 39–117)
ALT SERPL W P-5'-P-CCNC: 17 U/L (ref 1–33)
ANION GAP SERPL CALCULATED.3IONS-SCNC: 18 MMOL/L (ref 5–15)
AST SERPL-CCNC: 22 U/L (ref 1–32)
BASOPHILS # BLD AUTO: 0.08 10*3/MM3 (ref 0–0.2)
BASOPHILS NFR BLD AUTO: 0.7 % (ref 0–1.5)
BILIRUB SERPL-MCNC: 0.5 MG/DL (ref 0–1.2)
BUN SERPL-MCNC: 19 MG/DL (ref 6–20)
BUN/CREAT SERPL: 20.9 (ref 7–25)
CALCIUM SPEC-SCNC: 10.4 MG/DL (ref 8.6–10.5)
CHLORIDE SERPL-SCNC: 99 MMOL/L (ref 98–107)
CO2 SERPL-SCNC: 22 MMOL/L (ref 22–29)
CREAT SERPL-MCNC: 0.91 MG/DL (ref 0.57–1)
D DIMER PPP FEU-MCNC: 0.35 MCGFEU/ML (ref 0–0.49)
DEPRECATED RDW RBC AUTO: 41.6 FL (ref 37–54)
EOSINOPHIL # BLD AUTO: 0.03 10*3/MM3 (ref 0–0.4)
EOSINOPHIL NFR BLD AUTO: 0.3 % (ref 0.3–6.2)
ERYTHROCYTE [DISTWIDTH] IN BLOOD BY AUTOMATED COUNT: 11.8 % (ref 12.3–15.4)
GFR SERPL CREATININE-BSD FRML MDRD: 63 ML/MIN/1.73
GLOBULIN UR ELPH-MCNC: 3.5 GM/DL
GLUCOSE SERPL-MCNC: 99 MG/DL (ref 65–99)
HCT VFR BLD AUTO: 43.2 % (ref 34–46.6)
HGB BLD-MCNC: 14.5 G/DL (ref 12–15.9)
IMM GRANULOCYTES # BLD AUTO: 0.04 10*3/MM3 (ref 0–0.05)
IMM GRANULOCYTES NFR BLD AUTO: 0.3 % (ref 0–0.5)
INR PPP: 0.92 (ref 0.9–1.1)
LYMPHOCYTES # BLD AUTO: 2.95 10*3/MM3 (ref 0.7–3.1)
LYMPHOCYTES NFR BLD AUTO: 25.6 % (ref 19.6–45.3)
MAGNESIUM SERPL-MCNC: 2 MG/DL (ref 1.6–2.6)
MCH RBC QN AUTO: 32.3 PG (ref 26.6–33)
MCHC RBC AUTO-ENTMCNC: 33.6 G/DL (ref 31.5–35.7)
MCV RBC AUTO: 96.2 FL (ref 79–97)
MONOCYTES # BLD AUTO: 0.58 10*3/MM3 (ref 0.1–0.9)
MONOCYTES NFR BLD AUTO: 5 % (ref 5–12)
MYOGLOBIN SERPL-MCNC: 30.3 NG/ML (ref 25–58)
NEUTROPHILS NFR BLD AUTO: 68.1 % (ref 42.7–76)
NEUTROPHILS NFR BLD AUTO: 7.85 10*3/MM3 (ref 1.7–7)
NRBC BLD AUTO-RTO: 0 /100 WBC (ref 0–0.2)
PLATELET # BLD AUTO: 329 10*3/MM3 (ref 140–450)
PMV BLD AUTO: 10.6 FL (ref 6–12)
POTASSIUM SERPL-SCNC: 3.7 MMOL/L (ref 3.5–5.2)
PROT SERPL-MCNC: 9 G/DL (ref 6–8.5)
PROTHROMBIN TIME: 12.2 SECONDS (ref 11.7–14.2)
QT INTERVAL: 372 MS
RBC # BLD AUTO: 4.49 10*6/MM3 (ref 3.77–5.28)
SODIUM SERPL-SCNC: 139 MMOL/L (ref 136–145)
T4 FREE SERPL-MCNC: 1.4 NG/DL (ref 0.93–1.7)
TROPONIN T SERPL-MCNC: <0.01 NG/ML (ref 0–0.03)
TROPONIN T SERPL-MCNC: <0.01 NG/ML (ref 0–0.03)
TSH SERPL DL<=0.05 MIU/L-ACNC: 3.07 UIU/ML (ref 0.27–4.2)
WBC NRBC COR # BLD: 11.53 10*3/MM3 (ref 3.4–10.8)

## 2022-02-16 PROCEDURE — 93005 ELECTROCARDIOGRAM TRACING: CPT | Performed by: EMERGENCY MEDICINE

## 2022-02-16 PROCEDURE — 36415 COLL VENOUS BLD VENIPUNCTURE: CPT

## 2022-02-16 PROCEDURE — 83735 ASSAY OF MAGNESIUM: CPT | Performed by: EMERGENCY MEDICINE

## 2022-02-16 PROCEDURE — 85610 PROTHROMBIN TIME: CPT | Performed by: EMERGENCY MEDICINE

## 2022-02-16 PROCEDURE — 80050 GENERAL HEALTH PANEL: CPT | Performed by: EMERGENCY MEDICINE

## 2022-02-16 PROCEDURE — 93246 EXT ECG>7D<15D RECORDING: CPT

## 2022-02-16 PROCEDURE — 84439 ASSAY OF FREE THYROXINE: CPT | Performed by: EMERGENCY MEDICINE

## 2022-02-16 PROCEDURE — 71045 X-RAY EXAM CHEST 1 VIEW: CPT

## 2022-02-16 PROCEDURE — 84484 ASSAY OF TROPONIN QUANT: CPT | Performed by: EMERGENCY MEDICINE

## 2022-02-16 PROCEDURE — 85379 FIBRIN DEGRADATION QUANT: CPT | Performed by: EMERGENCY MEDICINE

## 2022-02-16 PROCEDURE — 93010 ELECTROCARDIOGRAM REPORT: CPT | Performed by: INTERNAL MEDICINE

## 2022-02-16 PROCEDURE — 83874 ASSAY OF MYOGLOBIN: CPT | Performed by: EMERGENCY MEDICINE

## 2022-02-16 PROCEDURE — 99284 EMERGENCY DEPT VISIT MOD MDM: CPT

## 2022-02-16 RX ORDER — SODIUM CHLORIDE 0.9 % (FLUSH) 0.9 %
10 SYRINGE (ML) INJECTION AS NEEDED
Status: DISCONTINUED | OUTPATIENT
Start: 2022-02-16 | End: 2022-02-16 | Stop reason: HOSPADM

## 2022-02-16 NOTE — DISCHARGE INSTRUCTIONS
Wear wean off of caffeine and avoid Sudafed.  Call [facility] for follow-up appointment or return to the emergency room if worse

## 2022-02-16 NOTE — ED PROVIDER NOTES
EMERGENCY DEPARTMENT ENCOUNTER    Room Number:  12/12  Date of encounter:  2/16/2022  PCP: Henry Jacinto MD  Historian: Patient and EMS      HPI:  Chief Complaint: Palpitations        Context: Radha Higgins is a 56 y.o. female who presents to the ED via EMS from the urgent care center for several weeks of palpitations.  The patient denies chest pain.  Patient states she had dental procedure several weeks ago and began noticing palpitations and a rash at that time.  She was placed on prednisone initially and then has had a Depo-Medrol shot since then.  Patient states she drinks at least 4 diet Mountain Dew's per day and sometimes coffee.  She denies taking Sudafed.  She states she had palpitations approximately 20 years ago and wore Holter monitor that was unremarkable.  Otherwise she denies cardiac history and no recent EKGs.  Patient states that during the day she feels some fluttering her chest at night she feels like her heart is beating heavy and skipping beats.  She denies chest pain, fevers, chills, shortness of breath, nausea, diaphoresis, lower extremity pain or lower extremity swelling.  Currently the patient is symptom-free-specifically no palpitations and no chest pain.      PAST MEDICAL HISTORY  Active Ambulatory Problems     Diagnosis Date Noted   • Essential hypertension 03/26/2016   • Raynaud's disease without gangrene 03/26/1998   • Pure hypercholesterolemia 04/26/2018   • Non-seasonal allergic rhinitis due to pollen 03/21/2019   • Gastroesophageal reflux disease without esophagitis 03/18/2020   • Overweight with body mass index (BMI) of 28 to 28.9 in adult 03/18/2021     Resolved Ambulatory Problems     Diagnosis Date Noted   • Sensory neuropathy, positional, left upper extremity 03/26/2018   • Acute bacterial bronchitis 02/18/2017   • Cellulitis 10/03/2017   • Elevated blood pressure reading without diagnosis of hypertension 11/29/2017   • Hoarseness 11/29/2017   • Insect bite 10/03/2017   •  Sinusitis, acute maxillary 2017   • Sore throat 2018   • Viral URI 2017     Past Medical History:   Diagnosis Date   • Allergic    • Hypertension          PAST SURGICAL HISTORY  Past Surgical History:   Procedure Laterality Date   •  SECTION      failure to progress         FAMILY HISTORY  Family History   Problem Relation Age of Onset   • Diabetes type II Mother    • Hypertension Father    • Hyperlipidemia Father          SOCIAL HISTORY  Social History     Socioeconomic History   • Marital status:      Spouse name: Khang   • Number of children: 1   Tobacco Use   • Smoking status: Never Smoker   • Smokeless tobacco: Never Used   Vaping Use   • Vaping Use: Never used   Substance and Sexual Activity   • Alcohol use: Yes     Alcohol/week: 2.0 standard drinks     Types: 2 Glasses of wine per week     Comment: occasionally.   • Drug use: No   • Sexual activity: Yes     Partners: Male     Birth control/protection: OCP         ALLERGIES  Patient has no known allergies.        REVIEW OF SYSTEMS  Review of Systems     Patient has headache, neck pain, chest pain, fevers, chills, cough, shortness of breath, known Covid exposure, abdominal pain, vomiting, diarrhea, lower extremity pain, extreme swelling or focal neuro deficit  All systems reviewed and negative except for those discussed in HPI.     PHYSICAL EXAM    I have reviewed the triage vital signs and nursing notes.    ED Triage Vitals   Temp Pulse Resp BP SpO2   -- -- -- -- --      Temp src Heart Rate Source Patient Position BP Location FiO2 (%)   -- -- -- -- --       GENERAL: 56-year-old well developed, well nourished in no acute distress  HENT: NCAT, neck supple, trachea midline  EYES: no scleral icterus, PERRL, normal conjunctivae  CV: regular rhythm, regular rate at 98, no murmur  RESPIRATORY: unlabored effort, CTAB  ABDOMEN: soft, nontender, nondistended, bowel sounds present  MUSCULOSKELETAL: no gross deformity, no pedal edema,  no calf tenderness  NEURO: alert,  sensory and motor function of extremities intact, speech clear, mental status normal  SKIN: warm, dry, no rash  PSYCH:  Appropriate mood and affect      PPE  Pt does not present with symptoms for COVID19; however, I was wearing a N95 mask and goggles throughout all patient interaction.    Vital signs and nursing notes reviewed.      LAB RESULTS  Recent Results (from the past 24 hour(s))   ECG 12 Lead    Collection Time: 02/16/22  9:22 AM   Result Value Ref Range    QT Interval 372 ms   Comprehensive Metabolic Panel    Collection Time: 02/16/22 10:16 AM    Specimen: Blood   Result Value Ref Range    Glucose 99 65 - 99 mg/dL    BUN 19 6 - 20 mg/dL    Creatinine 0.91 0.57 - 1.00 mg/dL    Sodium 139 136 - 145 mmol/L    Potassium 3.7 3.5 - 5.2 mmol/L    Chloride 99 98 - 107 mmol/L    CO2 22.0 22.0 - 29.0 mmol/L    Calcium 10.4 8.6 - 10.5 mg/dL    Total Protein 9.0 (H) 6.0 - 8.5 g/dL    Albumin 5.50 (H) 3.50 - 5.20 g/dL    ALT (SGPT) 17 1 - 33 U/L    AST (SGOT) 22 1 - 32 U/L    Alkaline Phosphatase 63 39 - 117 U/L    Total Bilirubin 0.5 0.0 - 1.2 mg/dL    eGFR Non African Amer 63 >60 mL/min/1.73    Globulin 3.5 gm/dL    A/G Ratio 1.6 g/dL    BUN/Creatinine Ratio 20.9 7.0 - 25.0    Anion Gap 18.0 (H) 5.0 - 15.0 mmol/L   Protime-INR    Collection Time: 02/16/22 10:16 AM    Specimen: Blood   Result Value Ref Range    Protime 12.2 11.7 - 14.2 Seconds    INR 0.92 0.90 - 1.10   Troponin    Collection Time: 02/16/22 10:16 AM    Specimen: Blood   Result Value Ref Range    Troponin T <0.010 0.000 - 0.030 ng/mL   Myoglobin, Serum    Collection Time: 02/16/22 10:16 AM    Specimen: Blood   Result Value Ref Range    Myoglobin 30.3 25.0 - 58.0 ng/mL   TSH    Collection Time: 02/16/22 10:16 AM    Specimen: Blood   Result Value Ref Range    TSH 3.070 0.270 - 4.200 uIU/mL   T4, Free    Collection Time: 02/16/22 10:16 AM    Specimen: Blood   Result Value Ref Range    Free T4 1.40 0.93 - 1.70 ng/dL   CBC  Auto Differential    Collection Time: 02/16/22 10:16 AM    Specimen: Blood   Result Value Ref Range    WBC 11.53 (H) 3.40 - 10.80 10*3/mm3    RBC 4.49 3.77 - 5.28 10*6/mm3    Hemoglobin 14.5 12.0 - 15.9 g/dL    Hematocrit 43.2 34.0 - 46.6 %    MCV 96.2 79.0 - 97.0 fL    MCH 32.3 26.6 - 33.0 pg    MCHC 33.6 31.5 - 35.7 g/dL    RDW 11.8 (L) 12.3 - 15.4 %    RDW-SD 41.6 37.0 - 54.0 fl    MPV 10.6 6.0 - 12.0 fL    Platelets 329 140 - 450 10*3/mm3    Neutrophil % 68.1 42.7 - 76.0 %    Lymphocyte % 25.6 19.6 - 45.3 %    Monocyte % 5.0 5.0 - 12.0 %    Eosinophil % 0.3 0.3 - 6.2 %    Basophil % 0.7 0.0 - 1.5 %    Immature Grans % 0.3 0.0 - 0.5 %    Neutrophils, Absolute 7.85 (H) 1.70 - 7.00 10*3/mm3    Lymphocytes, Absolute 2.95 0.70 - 3.10 10*3/mm3    Monocytes, Absolute 0.58 0.10 - 0.90 10*3/mm3    Eosinophils, Absolute 0.03 0.00 - 0.40 10*3/mm3    Basophils, Absolute 0.08 0.00 - 0.20 10*3/mm3    Immature Grans, Absolute 0.04 0.00 - 0.05 10*3/mm3    nRBC 0.0 0.0 - 0.2 /100 WBC   D-dimer, Quantitative    Collection Time: 02/16/22 10:16 AM    Specimen: Blood   Result Value Ref Range    D-Dimer, Quantitative 0.35 0.00 - 0.49 MCGFEU/mL   Magnesium    Collection Time: 02/16/22 10:16 AM    Specimen: Blood   Result Value Ref Range    Magnesium 2.0 1.6 - 2.6 mg/dL   Troponin    Collection Time: 02/16/22 11:38 AM    Specimen: Blood   Result Value Ref Range    Troponin T <0.010 0.000 - 0.030 ng/mL   Holter Monitor - 72 Hour Up To 15 Days    Collection Time: 02/16/22 12:45 PM   Result Value Ref Range    Target HR (85%) 138 bpm    Max. Pred. HR (100%) 162 bpm       Ordered the above labs and independently reviewed the results.        RADIOLOGY  XR Chest 1 View    Result Date: 2/16/2022  CHEST SINGLE VIEW  HISTORY: Abnormal EKG. Hypertension.  COMPARISON: None.  FINDINGS: Cardiomediastinal silhouette is within normal limits. Lungs appear clear and there is no evidence for pulmonary edema, pleural effusion or infiltrate. Cardiac  monitoring leads are noted.      No evidence for active disease in the chest.  This report was finalized on 2/16/2022 11:03 AM by Dr. Eddie Sinclair M.D.        I ordered the above noted radiological studies. Independently reviewed by me and discussed with radiologist.  See dictation above for official radiology interpretation.      PROCEDURES    Procedures        MEDICATIONS GIVEN IN ER    Medications   sodium chloride 0.9 % flush 10 mL (has no administration in time range)         PROGRESS, DATA ANALYSIS, CONSULTS, AND MEDICAL DECISION MAKING    All labs have been independently reviewed by me.  All radiology studies have been reviewed by me and discussed with radiologist dictating report.   EKG's independently reviewed by me.  Discussion below represents my analysis of pertinent findings related to patient's condition, differential diagnosis, treatment plan and final disposition.      ED Course as of 02/16/22 1404   Wed Feb 16, 2022   0927 EKG    EKG time: 922  Rhythm/Rate: Normal sinus rhythm 99  PVC  Left bundle branch block  No Acute Ischemia  Non-Specific ST-T changes  No old EKG for comparison    Interpreted Contemporaneously by me.  Independently viewed by me     [GP]   0965 The patient does have a left bundle branch block on her EKG of unknown duration.  Currently she is asymptomatic and I will obtain labs and chest x-ray for further evaluation. [GP]   1247 On repeat examination the patient is resting comfortably with no complaints.  She is in a normal sinus rhythm at eighty-six with occasional PVCs.  I have ordered a Zio patch. [GP]   1340 Patient is now resting comfortably.  I advised her that her work-up is unremarkable and that she will need to follow-up with cardiology in regards to her palpitations, PVCs and left bundle branch block.  I advised the patient to wean off of her caffeine and avoid Sudafed.  I advised her that stress will make this worse and she states she is under a lot of stress at  work currently. [GP]      ED Course User Index  [GP] Eddie Castelan MD           The differential diagnosis includes but is not limited to premature ventricular contractions, premature atrial contractions, supraventricular tachycardia, atrial fibrillation, atrial flutter, or sinus arrhythmia.        AS OF 14:04 EST VITALS:    BP - 126/91  HR - 77  TEMP -    02 SATS - 99%        DIAGNOSIS  Final diagnoses:   Heart palpitations   PVC's (premature ventricular contractions)   Left bundle branch block         DISPOSITION  DISCHARGE    Patient discharged in stable condition.    Reviewed implications of results, diagnosis, meds, responsibility to follow up, warning signs and symptoms of possible worsening, potential complications and reasons to return to ER, including worsening symptoms of dizziness, passing out or chest pain.    Patient/Family voiced understanding of above instructions.    Discussed plan for discharge, as there is no emergent indication for admission.  Pt/family is agreeable and understands need for follow up and repeat testing.  Pt is aware that discharge does not mean that nothing is wrong but it indicates no emergency is present and they must continue care with follow-up as given below or physician of their choice.     FOLLOW-UP  [facility]  [address]  [address]  [facility] [id number] [phone]  Schedule an appointment as soon as possible for a visit                 EMR Dragon/Transcription disclaimer:   Much of this encounter note is an electronic transcription/translation of spoken language to printed text.        Eddie Castelan MD  02/16/22 8448

## 2022-02-22 ENCOUNTER — OFFICE VISIT (OUTPATIENT)
Dept: CARDIOLOGY | Facility: CLINIC | Age: 57
End: 2022-02-22

## 2022-02-22 VITALS
OXYGEN SATURATION: 98 % | HEART RATE: 97 BPM | BODY MASS INDEX: 29.06 KG/M2 | WEIGHT: 148 LBS | SYSTOLIC BLOOD PRESSURE: 114 MMHG | HEIGHT: 60 IN | DIASTOLIC BLOOD PRESSURE: 72 MMHG

## 2022-02-22 DIAGNOSIS — R00.2 PALPITATIONS: ICD-10-CM

## 2022-02-22 DIAGNOSIS — I49.3 PVC'S (PREMATURE VENTRICULAR CONTRACTIONS): ICD-10-CM

## 2022-02-22 DIAGNOSIS — I44.7 LBBB (LEFT BUNDLE BRANCH BLOCK): Primary | ICD-10-CM

## 2022-02-22 PROCEDURE — 99204 OFFICE O/P NEW MOD 45 MIN: CPT | Performed by: INTERNAL MEDICINE

## 2022-02-27 NOTE — PROGRESS NOTES
Date of Office Visit:  2022  Encounter Provider: Travis Conklin MD  Place of Service: [facility]  Patient Name: Radha Higgins  :1965    Chief complaint: Palpitations, left bundle branch block, PVCs.    History of Present Illness:    I had the pleasure of seeing the patient in cardiology office on 2022.  She is a very pleasant 56 year-old female with a history of hypertension who presents for evaluation.    The patient states that she started noticing more palpitations in 2022.  She describes these as a fluttering sensation which are typically brief in nature, and can feel like a brief pounding sensation in her chest.  She typically feels these when lying down and reclining, and anxiety typically makes them worse.  She went to the emergency department on 2022.  At that time, she was noted to have PVCs on the monitor.  She was also found to have a left bundle branch block on her EKG, which was previously undiagnosed.  On questioning, she has had no chest pain or shortness of breath.  She denied any exertional symptoms.  She is fairly anxious about the diagnosis of the left bundle branch block and PVCs.  She is currently wearing a Zio patch which was placed on 2022 after her ER visit.    Past Medical History:   Diagnosis Date   • Allergic    • Hypertension    • LBBB (left bundle branch block)    • PVC's (premature ventricular contractions)    • Sensory neuropathy, positional, left upper extremity 3/26/2018       Past Surgical History:   Procedure Laterality Date   •  SECTION      failure to progress       Current Outpatient Medications on File Prior to Visit   Medication Sig Dispense Refill   • Calcium Carbonate-Vitamin D3 (CALCIUM 600-D) 600-400 MG-UNIT tablet Take 2 tablets by mouth Daily.     • lansoprazole (PREVACID) 15 MG capsule Take 15 mg by mouth Before Breakfast.     • Multiple Vitamins-Minerals (MULTIVITAMIN ADULT PO)  "Take  by mouth.     • Pirmella 1/35 1-35 MG-MCG per tablet      • rosuvastatin (CRESTOR) 5 MG tablet Take 1 tablet by mouth Every Night for 180 days. 90 tablet 1   • telmisartan-hydrochlorothiazide (MICARDIS HCT) 80-25 MG per tablet TAKE 1 TABLET DAILY 30 tablet 0     No current facility-administered medications on file prior to visit.     Allergies as of 02/22/2022   • (No Known Allergies)     Social History     Socioeconomic History   • Marital status:      Spouse name: Khang   • Number of children: 1   Tobacco Use   • Smoking status: Never Smoker   • Smokeless tobacco: Never Used   Vaping Use   • Vaping Use: Never used   Substance and Sexual Activity   • Alcohol use: Yes     Alcohol/week: 2.0 standard drinks     Types: 2 Glasses of wine per week     Comment: occasionally.   • Drug use: No   • Sexual activity: Yes     Partners: Male     Birth control/protection: OCP     Family History   Problem Relation Age of Onset   • Diabetes type II Mother    • Hypertension Father    • Hyperlipidemia Father        Review of Systems   Cardiovascular: Positive for palpitations.   Psychiatric/Behavioral: The patient is nervous/anxious.    All other systems reviewed and are negative.     Objective:     Vitals:    02/22/22 0947   BP: 114/72   Pulse: 97   SpO2: 98%   Weight: 67.1 kg (148 lb)   Height: 152.4 cm (60\")     Body mass index is 28.9 kg/m².    Constitutional:       Appearance: Healthy appearance. Well-developed.   Eyes:      Conjunctiva/sclera: Conjunctivae normal.   HENT:      Head: Normocephalic and atraumatic.   Pulmonary:      Effort: Pulmonary effort is normal.      Breath sounds: Normal breath sounds.   Cardiovascular:      Normal rate. Regular rhythm.      Murmurs: There is no murmur.      No gallop.   Edema:     Peripheral edema absent.   Abdominal:      Palpations: Abdomen is soft.      Tenderness: There is no abdominal tenderness.   Musculoskeletal:      Cervical back: Neck supple. Skin:     General: Skin " "is warm.   Neurological:      Mental Status: Alert and oriented to person, place, and time.   Psychiatric:         Behavior: Behavior normal.       Lab Review:   Procedures    Lipid Panel    Lipid Panel 3/12/21 6/21/21 2/24/22   Total Cholesterol 238 (A) 170 173   Triglycerides 93 194 (A) 68   HDL Cholesterol 77 (A)     VLDL Cholesterol 16     LDL Cholesterol  145 (A)     (A) Abnormal value       Comments are available for some flowsheets but are not being displayed.              Cardiac Procedures:      Assessment:       Diagnosis Plan   1. LBBB (left bundle branch block)  Adult Transthoracic Echo Complete w/ Color, Spectral and Contrast if Necessary Per Protocol   2. PVC's (premature ventricular contractions)  Adult Transthoracic Echo Complete w/ Color, Spectral and Contrast if Necessary Per Protocol   3. Palpitations  Adult Transthoracic Echo Complete w/ Color, Spectral and Contrast if Necessary Per Protocol     Plan:       I had a long discussion with the patient today.  I went over PVCs and the left bundle branch block in detail.  I reassured her that the PVCs are not dangerous, although they can cause symptoms of palpitations.  I suspect that this is what her palpitations are originating from.  However, she does have a Zio patch which was placed on 2/16/2022 after her ER visit.  I will follow up on the results of this.  I did tell her to avoid triggers for PVCs such as alcohol, caffeine, and stress.    With regards to the left bundle branch block, I also explained this to her as well.  Although she is not symptomatic, with the left bundle branch block, I am going to check an echocardiogram to ensure that she does not have any structural heart disease.  I also reassured her that this is not a \"blockage\" in her arteries, although it is often associated with structural heart disease which needs to be checked out more thoroughly.    Further plans will be made pending the results of the Zio patch and the " echocardiogram.

## 2022-02-28 ENCOUNTER — OFFICE VISIT (OUTPATIENT)
Dept: FAMILY MEDICINE CLINIC | Facility: CLINIC | Age: 57
End: 2022-02-28

## 2022-02-28 VITALS
OXYGEN SATURATION: 100 % | HEART RATE: 87 BPM | TEMPERATURE: 97.5 F | BODY MASS INDEX: 29.84 KG/M2 | DIASTOLIC BLOOD PRESSURE: 92 MMHG | HEIGHT: 60 IN | RESPIRATION RATE: 16 BRPM | WEIGHT: 152 LBS | SYSTOLIC BLOOD PRESSURE: 146 MMHG

## 2022-02-28 DIAGNOSIS — I10 ESSENTIAL HYPERTENSION: Primary | ICD-10-CM

## 2022-02-28 DIAGNOSIS — E78.00 PURE HYPERCHOLESTEROLEMIA: ICD-10-CM

## 2022-02-28 DIAGNOSIS — I44.7 LBBB (LEFT BUNDLE BRANCH BLOCK): ICD-10-CM

## 2022-02-28 PROCEDURE — 99214 OFFICE O/P EST MOD 30 MIN: CPT | Performed by: FAMILY MEDICINE

## 2022-02-28 RX ORDER — ROSUVASTATIN CALCIUM 10 MG/1
10 TABLET, COATED ORAL NIGHTLY
Qty: 90 TABLET | Refills: 1 | Status: SHIPPED | OUTPATIENT
Start: 2022-02-28 | End: 2022-08-15

## 2022-02-28 RX ORDER — TELMISARTAN AND HYDROCHLORTHIAZIDE 80; 25 MG/1; MG/1
1 TABLET ORAL DAILY
Qty: 90 TABLET | Refills: 1 | Status: SHIPPED | OUTPATIENT
Start: 2022-02-28 | End: 2022-08-22 | Stop reason: SDUPTHER

## 2022-02-28 RX ORDER — ROSUVASTATIN CALCIUM 5 MG/1
5 TABLET, COATED ORAL NIGHTLY
Qty: 90 TABLET | Refills: 1 | Status: SHIPPED | OUTPATIENT
Start: 2022-02-28 | End: 2022-02-28 | Stop reason: SDUPTHER

## 2022-02-28 RX ORDER — METOPROLOL SUCCINATE 25 MG/1
12.5 TABLET, EXTENDED RELEASE ORAL DAILY
Qty: 45 TABLET | Refills: 1 | Status: SHIPPED | OUTPATIENT
Start: 2022-02-28 | End: 2022-08-15

## 2022-02-28 NOTE — ASSESSMENT & PLAN NOTE
Lipid abnormalities are improving with treatment.  Pharmacotherapy as ordered.  Plan increase rosuvastatin 10 mg at bedtime dosing.  Lipids will be reassessed at our next appt. .

## 2022-02-28 NOTE — ASSESSMENT & PLAN NOTE
Left bundle branch block newly identified.  Cardiovascular work-up ongoing.  She finishes her Holter monitor in the next 24 hours and has echocardiogram scheduled.

## 2022-02-28 NOTE — ASSESSMENT & PLAN NOTE
Hypertension is worsening.  Medication changes per orders. add metoprolol to current regimen.  Blood pressure will be reassessed at the next regular appointment.

## 2022-02-28 NOTE — PROGRESS NOTES
"Chief Complaint  Follow-up, Hyperlipidemia, and Hypertension    Subjective          Radha presents to [facility]  To refill medicines.  Since the last visit she went to the emergency room with palpitation and is still undergoing work-up for that condition.  She was found to have left bundle branch block.  Her anxiety about this new condition is elevated.  Today her blood pressure is upper limits of normal.  No medication side effects as long she takes the medicine in the morning.        Objective   Vital Signs:   Vitals:    02/28/22 0814   BP: 146/92   Pulse: 87   Resp: 16   Temp: 97.5 °F (36.4 °C)   TempSrc: Skin   SpO2: 100%   Weight: 68.9 kg (152 lb)   Height: 152.4 cm (60\")        Physical Exam  Vitals reviewed.   Constitutional:       General: She is not in acute distress.  Eyes:      General: Lids are normal.      Conjunctiva/sclera: Conjunctivae normal.   Neck:      Vascular: No carotid bruit.      Trachea: No tracheal deviation.   Cardiovascular:      Rate and Rhythm: Normal rate and regular rhythm.      Heart sounds: Normal heart sounds. No murmur heard.      Pulmonary:      Effort: Pulmonary effort is normal.      Breath sounds: Normal breath sounds.   Skin:     General: Skin is warm and dry.   Neurological:      Mental Status: She is alert. She is not disoriented.   Psychiatric:         Mood and Affect: Mood is anxious.         Speech: Speech normal.         Behavior: Behavior normal. Behavior is cooperative.          Result Review :     The following data was reviewed by: Henry Jacinto MD on 02/28/2022:  ECG 12 Lead (02/16/2022 09:22)           Assessment and Plan    Diagnoses and all orders for this visit:    1. Essential hypertension (Primary)  Assessment & Plan:  Hypertension is worsening.  Medication changes per orders. add metoprolol to current regimen.  Blood pressure will be reassessed at the next regular appointment.    Orders:  -     telmisartan-hydrochlorothiazide " Problem: Fall Risk (Adult)  Goal: Absence of Falls  Patient will demonstrate the desired outcomes by discharge/transition of care.   Bed alarm on and bed in lowest position. Call bell in reach. Instructed to call for assistance before getting out of bed. Verbalized understanding.       (MICARDIS HCT) 80-25 MG per tablet; Take 1 tablet by mouth Daily.  Dispense: 90 tablet; Refill: 1  -     metoprolol succinate XL (Toprol XL) 25 MG 24 hr tablet; Take 0.5 tablets by mouth Daily for 180 days.  Dispense: 45 tablet; Refill: 1    2. Pure hypercholesterolemia  Assessment & Plan:  Lipid abnormalities are improving with treatment.  Pharmacotherapy as ordered.  Plan increase rosuvastatin 10 mg at bedtime dosing.  Lipids will be reassessed at our next appt. .    Orders:  -     Discontinue: rosuvastatin (CRESTOR) 5 MG tablet; Take 1 tablet by mouth Every Night for 180 days.  Dispense: 90 tablet; Refill: 1  -     rosuvastatin (CRESTOR) 10 MG tablet; Take 1 tablet by mouth Every Night for 180 days.  Dispense: 90 tablet; Refill: 1    3. LBBB (left bundle branch block)  Assessment & Plan:  Left bundle branch block newly identified.  Cardiovascular work-up ongoing.  She finishes her Holter monitor in the next 24 hours and has echocardiogram scheduled.        Follow Up   Return in about 6 weeks (around 4/11/2022) for recheck/refill medication.  Patient was given instructions and counseling regarding her condition or for health maintenance advice. Please see specific information pulled into the AVS if appropriate.

## 2022-03-01 DIAGNOSIS — E78.00 PURE HYPERCHOLESTEROLEMIA: ICD-10-CM

## 2022-03-01 RX ORDER — ROSUVASTATIN CALCIUM 5 MG/1
TABLET, COATED ORAL
Qty: 90 TABLET | Refills: 1 | OUTPATIENT
Start: 2022-03-01

## 2022-03-04 ENCOUNTER — TELEPHONE (OUTPATIENT)
Dept: CARDIOLOGY | Facility: CLINIC | Age: 57
End: 2022-03-04

## 2022-03-04 NOTE — TELEPHONE ENCOUNTER
On 2/28/22 pt saw Dr. Jacinto    1. Essential hypertension (Primary)  Assessment & Plan:  Hypertension is worsening.  Medication changes per orders. add metoprolol to current regimen.  Blood pressure will be reassessed at the next regular appointment            Pt called wanting to make sure that you were ok with her stating on the Metoprolol

## 2022-03-09 ENCOUNTER — HOSPITAL ENCOUNTER (OUTPATIENT)
Dept: CARDIOLOGY | Facility: HOSPITAL | Age: 57
Discharge: HOME | End: 2022-03-09
Admitting: INTERNAL MEDICINE

## 2022-03-09 VITALS
HEART RATE: 96 BPM | BODY MASS INDEX: 29.84 KG/M2 | WEIGHT: 152 LBS | DIASTOLIC BLOOD PRESSURE: 80 MMHG | SYSTOLIC BLOOD PRESSURE: 130 MMHG | HEIGHT: 60 IN | OXYGEN SATURATION: 97 %

## 2022-03-09 DIAGNOSIS — R00.2 PALPITATIONS: ICD-10-CM

## 2022-03-09 DIAGNOSIS — I49.3 PVC'S (PREMATURE VENTRICULAR CONTRACTIONS): ICD-10-CM

## 2022-03-09 DIAGNOSIS — I44.7 LBBB (LEFT BUNDLE BRANCH BLOCK): ICD-10-CM

## 2022-03-09 LAB
ASCENDING AORTA: 2.9 CM
BH CV ECHO MEAS - ACS: 1.61 CM
BH CV ECHO MEAS - AO MAX PG: 14.4 MMHG
BH CV ECHO MEAS - AO MEAN PG: 8.1 MMHG
BH CV ECHO MEAS - AO ROOT DIAM: 2.35 CM
BH CV ECHO MEAS - AO V2 MAX: 190 CM/SEC
BH CV ECHO MEAS - AO V2 VTI: 32.1 CM
BH CV ECHO MEAS - AVA(I,D): 1.94 CM2
BH CV ECHO MEAS - EDV(MOD-SP2): 99 ML
BH CV ECHO MEAS - EDV(MOD-SP4): 85 ML
BH CV ECHO MEAS - EF(MOD-BP): 62.5 %
BH CV ECHO MEAS - EF(MOD-SP2): 62.6 %
BH CV ECHO MEAS - EF(MOD-SP4): 62.4 %
BH CV ECHO MEAS - ESV(MOD-SP2): 37 ML
BH CV ECHO MEAS - ESV(MOD-SP4): 32 ML
BH CV ECHO MEAS - FS: 31.1 %
BH CV ECHO MEAS - IVS/LVPW: 0.89 CM
BH CV ECHO MEAS - IVSD: 0.87 CM
BH CV ECHO MEAS - LAT PEAK E' VEL: 8.2 CM/SEC
BH CV ECHO MEAS - LV DIASTOLIC VOL/BSA (35-75): 51.2 CM2
BH CV ECHO MEAS - LV MASS(C)D: 114.2 GRAMS
BH CV ECHO MEAS - LV MAX PG: 5.5 MMHG
BH CV ECHO MEAS - LV MEAN PG: 3.3 MMHG
BH CV ECHO MEAS - LV SYSTOLIC VOL/BSA (12-30): 19.3 CM2
BH CV ECHO MEAS - LV V1 MAX: 117.1 CM/SEC
BH CV ECHO MEAS - LV V1 VTI: 22.2 CM
BH CV ECHO MEAS - LVIDD: 4 CM
BH CV ECHO MEAS - LVIDS: 2.8 CM
BH CV ECHO MEAS - LVOT AREA: 2.8 CM2
BH CV ECHO MEAS - LVOT DIAM: 1.89 CM
BH CV ECHO MEAS - LVPWD: 0.98 CM
BH CV ECHO MEAS - MED PEAK E' VEL: 5.9 CM/SEC
BH CV ECHO MEAS - MV A DUR: 0.09 SEC
BH CV ECHO MEAS - MV A MAX VEL: 144.1 CM/SEC
BH CV ECHO MEAS - MV DEC SLOPE: 760.3 CM/SEC2
BH CV ECHO MEAS - MV DEC TIME: 0.15 MSEC
BH CV ECHO MEAS - MV E MAX VEL: 109 CM/SEC
BH CV ECHO MEAS - MV E/A: 0.76
BH CV ECHO MEAS - MV MAX PG: 8.9 MMHG
BH CV ECHO MEAS - MV MEAN PG: 4.4 MMHG
BH CV ECHO MEAS - MV V2 VTI: 24.9 CM
BH CV ECHO MEAS - MVA(VTI): 2.5 CM2
BH CV ECHO MEAS - PA ACC TIME: 0.07 SEC
BH CV ECHO MEAS - PA PR(ACCEL): 48.1 MMHG
BH CV ECHO MEAS - PA V2 MAX: 123.5 CM/SEC
BH CV ECHO MEAS - PI END-D VEL: 89.6 CM/SEC
BH CV ECHO MEAS - PULM A REVS DUR: 0.07 SEC
BH CV ECHO MEAS - PULM A REVS VEL: 38.3 CM/SEC
BH CV ECHO MEAS - PULM DIAS VEL: 31.2 CM/SEC
BH CV ECHO MEAS - PULM SYS VEL: 65.1 CM/SEC
BH CV ECHO MEAS - RAP SYSTOLE: 3 MMHG
BH CV ECHO MEAS - RV MAX PG: 3.1 MMHG
BH CV ECHO MEAS - RV V1 MAX: 88.6 CM/SEC
BH CV ECHO MEAS - RV V1 VTI: 17 CM
BH CV ECHO MEAS - RVOT DIAM: 1.91 CM
BH CV ECHO MEAS - RVSP: 30.2 MMHG
BH CV ECHO MEAS - SI(MOD-SP2): 37.3 ML/M2
BH CV ECHO MEAS - SI(MOD-SP4): 31.9 ML/M2
BH CV ECHO MEAS - SV(LVOT): 62.3 ML
BH CV ECHO MEAS - SV(MOD-SP2): 62 ML
BH CV ECHO MEAS - SV(MOD-SP4): 53 ML
BH CV ECHO MEAS - SV(RVOT): 48.5 ML
BH CV ECHO MEAS - TAPSE (>1.6): 1.92 CM
BH CV ECHO MEAS - TR MAX PG: 27.2 MMHG
BH CV ECHO MEAS - TR MAX VEL: 260.6 CM/SEC
BH CV ECHO MEASUREMENTS AVERAGE E/E' RATIO: 15.46
BH CV XLRA - RV BASE: 2.5 CM
BH CV XLRA - RV LENGTH: 7.3 CM
BH CV XLRA - RV MID: 2.8 CM
BH CV XLRA - TDI S': 10.8 CM/SEC
LEFT ATRIUM VOLUME INDEX: 19.2 ML/M2
MAXIMAL PREDICTED HEART RATE: 162 BPM
MAXIMAL PREDICTED HEART RATE: 162 BPM
SINUS: 2.4 CM
STJ: 2.3 CM
STRESS TARGET HR: 138 BPM
STRESS TARGET HR: 138 BPM

## 2022-03-09 PROCEDURE — 93306 TTE W/DOPPLER COMPLETE: CPT

## 2022-03-09 PROCEDURE — 25010000002 PERFLUTREN (DEFINITY) 8.476 MG IN SODIUM CHLORIDE (PF) 0.9 % 10 ML INJECTION: Performed by: INTERNAL MEDICINE

## 2022-03-09 PROCEDURE — 93306 TTE W/DOPPLER COMPLETE: CPT | Performed by: INTERNAL MEDICINE

## 2022-03-09 PROCEDURE — 93248 EXT ECG>7D<15D REV&INTERPJ: CPT | Performed by: INTERNAL MEDICINE

## 2022-03-09 RX ADMIN — PERFLUTREN 1.5 ML: 6.52 INJECTION, SUSPENSION INTRAVENOUS at 08:46

## 2022-03-10 RX ORDER — ATENOLOL 25 MG/1
25 TABLET ORAL DAILY PRN
Qty: 30 TABLET | Refills: 1 | Status: SHIPPED | OUTPATIENT
Start: 2022-03-10 | End: 2022-04-11

## 2022-03-14 NOTE — PROGRESS NOTES
Called and gave her the results.  Also discussed her monitor, which showed PVCs.  I reassured her that these are benign in nature.  Her ejection fraction is normal on the echocardiogram.  I prescribed atenolol to be taken as needed, and her PCP actually had already prescribed Toprol-XL 12.5 mg/day.  She will call me with any issues.  Otherwise, I will see her back in 6 months.    JEREMIE

## 2022-03-15 DIAGNOSIS — I10 ESSENTIAL HYPERTENSION: ICD-10-CM

## 2022-03-15 RX ORDER — TELMISARTAN AND HYDROCHLORTHIAZIDE 80; 25 MG/1; MG/1
TABLET ORAL
Qty: 30 TABLET | Refills: 0 | OUTPATIENT
Start: 2022-03-15

## 2022-04-10 NOTE — PROGRESS NOTES
"Chief Complaint  Hypertension    Subjective          Radha presents to [facility]  To follow up on BP and lipids.         Objective   Vital Signs:   Vitals:    04/11/22 0846   BP: 118/82   Pulse: 95   Resp: 16   Temp: 97.6 °F (36.4 °C)   TempSrc: Skin   SpO2: 100%   Weight: 67.6 kg (149 lb)   Height: 152.4 cm (60\")                Physical Exam  Vitals reviewed.   Constitutional:       General: She is not in acute distress.  Eyes:      General: Lids are normal.      Conjunctiva/sclera: Conjunctivae normal.   Neck:      Vascular: No carotid bruit.      Trachea: No tracheal deviation.   Cardiovascular:      Rate and Rhythm: Normal rate and regular rhythm.      Heart sounds: Normal heart sounds. No murmur heard.  Pulmonary:      Effort: Pulmonary effort is normal.      Breath sounds: Normal breath sounds.   Skin:     General: Skin is warm and dry.   Neurological:      Mental Status: She is alert. She is not disoriented.   Psychiatric:         Speech: Speech normal.         Behavior: Behavior normal. Behavior is cooperative.          Result Review :                Assessment and Plan    Diagnoses and all orders for this visit:    1. Essential hypertension (Primary)  Assessment & Plan:  Hypertension is improving with treatment.  Continue current treatment regimen.  Blood pressure will be reassessed at the next regular appointment.    Orders:  -     CBC & Differential; Future  -     Comprehensive Metabolic Panel; Future    2. Pure hypercholesterolemia  Assessment & Plan:  The current medical regimen is effective;  continue present plan and medications.      Orders:  -     NMR LipoProfile; Future  -     CK; Future      Follow Up   Return in about 4 months (around 8/24/2022).  Patient was given instructions and counseling regarding her condition or for health maintenance advice. Please see specific information pulled into the AVS if appropriate.   "

## 2022-04-11 ENCOUNTER — OFFICE VISIT (OUTPATIENT)
Dept: FAMILY MEDICINE CLINIC | Facility: CLINIC | Age: 57
End: 2022-04-11

## 2022-04-11 VITALS
TEMPERATURE: 97.6 F | HEART RATE: 95 BPM | DIASTOLIC BLOOD PRESSURE: 82 MMHG | HEIGHT: 60 IN | SYSTOLIC BLOOD PRESSURE: 118 MMHG | WEIGHT: 149 LBS | OXYGEN SATURATION: 100 % | RESPIRATION RATE: 16 BRPM | BODY MASS INDEX: 29.25 KG/M2

## 2022-04-11 DIAGNOSIS — I10 ESSENTIAL HYPERTENSION: Primary | ICD-10-CM

## 2022-04-11 DIAGNOSIS — E78.00 PURE HYPERCHOLESTEROLEMIA: ICD-10-CM

## 2022-04-11 PROCEDURE — 99213 OFFICE O/P EST LOW 20 MIN: CPT | Performed by: FAMILY MEDICINE

## 2022-06-15 ENCOUNTER — TELEMEDICINE (OUTPATIENT)
Dept: FAMILY MEDICINE CLINIC | Facility: CLINIC | Age: 57
End: 2022-06-15

## 2022-06-15 DIAGNOSIS — R11.0 NAUSEA: ICD-10-CM

## 2022-06-15 DIAGNOSIS — R05.8 COUGH WITH CONGESTION OF PARANASAL SINUS: ICD-10-CM

## 2022-06-15 DIAGNOSIS — R09.81 COUGH WITH CONGESTION OF PARANASAL SINUS: ICD-10-CM

## 2022-06-15 DIAGNOSIS — U07.1 COVID-19: Primary | ICD-10-CM

## 2022-06-15 PROCEDURE — 99213 OFFICE O/P EST LOW 20 MIN: CPT | Performed by: NURSE PRACTITIONER

## 2022-06-15 RX ORDER — DOXYCYCLINE 100 MG/1
100 TABLET ORAL 2 TIMES DAILY
Qty: 20 TABLET | Refills: 0 | Status: SHIPPED | OUTPATIENT
Start: 2022-06-15 | End: 2022-06-25

## 2022-06-15 RX ORDER — ONDANSETRON 4 MG/1
4 TABLET, ORALLY DISINTEGRATING ORAL EVERY 8 HOURS PRN
Qty: 30 TABLET | Refills: 1 | Status: SHIPPED | OUTPATIENT
Start: 2022-06-15 | End: 2022-06-25

## 2022-06-15 NOTE — PROGRESS NOTES
"Chief Complaint  Covid-19 Home Monitoring Video Visit (Positive/), Earache, Nausea, and Diarrhea    Subjective          You have chosen to receive care through a telehealth visit.  Do you consent to use a video/audio connection for your medical care today? Yes    Radha presents to [facility]  For a video tele-health visit    57 y/o female presented today c/o testing positive for Covid 19 on 6/14/2022.  Symptom onset 612/2022.  Cough-  Slightly productive-  Yellow sputum.  Low grade fever-  Tmax 99.8.  Ear pain and pressure.  Nausea yesterday  Improved today and intermittent diarrhea,  NO abdominal pain.  No sob.  She just finished a prednisone pack for some thumb pain.  She is taking otc mucinex and vitamins.    She has no other c/o today    The following portions of the patient's history were reviewed and updated as appropriate: allergies, current medications, past family history, past medical history, past social history, past surgical history, and problem list    Review of Systems   Constitutional: Positive for fatigue and fever. Negative for chills.   HENT: Positive for congestion, ear pain and sinus pressure.    Eyes: Negative for visual disturbance.   Respiratory: Positive for cough. Negative for shortness of breath and wheezing.    Cardiovascular: Negative for chest pain, palpitations and leg swelling.   Skin: Negative for rash.   Neurological: Negative for dizziness and light-headedness.        Objective   Vital Signs: unable to assess on this visit  Temp 99.0  Height 60.5\" weight 145lbs  BMI 27.8  There were no vitals filed for this visit.     BMI is >= 25 and <30. (Overweight) The following options were offered after discussion;: weight loss educational material (shared in after visit summary)      Virtual  Physical Exam  HENT:      Head: Normocephalic.   Pulmonary:      Effort: Pulmonary effort is normal.   Neurological:      Mental Status: She is alert and oriented to " person, place, and time.   Psychiatric:         Mood and Affect: Mood normal.          Result Review :     The following data was reviewed by: FLACA Conrad on 06/15/2022:      Positive Covid 19 6/14/2022  Symptom onset 6/12/2022     Assessment and Plan    Diagnoses and all orders for this visit:    1. COVID-19 (Primary)  Comments:  Positive Covid 19 6/14/2022  Symptom onset 6/12/2022    Plan:  -Take all medications as prescribed and until completed.  -Covid test was positive on 106/14/2022  -Monitor for fever and take Tylenol as needed.  Drink plenty of fluids and get plenty of rest.  -Use cool-mist humidifier as needed.  -Seek immediate medical attention for fever unrelieved by Tylenol, chest pain, shortness of breath, sharp back pain, or any other worsening signs or symptoms.  -Remain in quarantine until 10 days from symptom onset.  -The patient verbalized understanding of all instructions given today.     I would recommend an over-the-counter vitamin regimen to boost your immune system of the following: Vitamin D3 5,000 IU daily, vitamin C 500-1,000 mg twice daily, Quercetin 250 mg twice daily, Zinc 100 mg/day, and Melatonin 5-10 mg before bedtime.  You can purchase a pulse oximeter at any local pharmacy to monitor your oxygen saturations.  Call 911 if you have shortness of breath, sharp chest pain, sharp pain in your back, or a fever that will not come down by Tylenol        2. Cough with congestion of paranasal sinus  Comments:  may continue mucinex  Orders:  -     doxycycline (ADOXA) 100 MG tablet; Take 1 tablet by mouth 2 (Two) Times a Day for 10 days.  Dispense: 20 tablet; Refill: 0    3. Nausea  Comments:  frequent small slips of fluid  Orders:  -     ondansetron ODT (Zofran ODT) 4 MG disintegrating tablet; Place 1 tablet on the tongue Every 8 (Eight) Hours As Needed for Nausea or Vomiting for up to 10 days.  Dispense: 30 tablet; Refill: 1      This was an audio and video enabled telemedicine  encounter.  Video visit lasted approx 14 minutes    Follow Up   Return if symptoms worsen or fail to improve.  Patient was given instructions and counseling regarding her condition or for health maintenance advice. Please see specific information pulled into the AVS if appropriate.

## 2022-06-17 ENCOUNTER — TELEPHONE (OUTPATIENT)
Dept: FAMILY MEDICINE CLINIC | Facility: CLINIC | Age: 57
End: 2022-06-17

## 2022-06-17 ENCOUNTER — TELEPHONE (OUTPATIENT)
Dept: CARDIOLOGY | Facility: CLINIC | Age: 57
End: 2022-06-17

## 2022-06-17 NOTE — TELEPHONE ENCOUNTER
Patient called because she tested positive for COVID a couple days ago. She said she believes it is just a mild case because her only symptom is her ears are plugged up and a little bit of congestion. She saw an NP who gave her an antibiotic.     She calls us because she feels like her palpitations have increased since having COVID. /93 HR 92. She denies any other symptoms with these palpitations. She has been taking her toprol 12.5mg daily. I told her that COVID could be causing her to have more palpitations, but as long as she was not having any other associated symptoms there shouldn't be any reason to be concerned.     Do you have any other further recommendations at this time?    Shahana Walker RN  Triage [facility]

## 2022-06-17 NOTE — TELEPHONE ENCOUNTER
I agree with this.  The COVID is likely causing her palpitations to increase.  She can take an extra Toprol XL 12.5 mg the next few days, and this may help some of the symptoms.  Thanks     JEREMIE

## 2022-06-17 NOTE — TELEPHONE ENCOUNTER
Notified patient of recommendations. Patient verbalized understanding.    Shahana Walker RN  [place] [facility] [place]

## 2022-08-14 DIAGNOSIS — I10 ESSENTIAL HYPERTENSION: ICD-10-CM

## 2022-08-14 DIAGNOSIS — E78.00 PURE HYPERCHOLESTEROLEMIA: ICD-10-CM

## 2022-08-15 RX ORDER — METOPROLOL SUCCINATE 25 MG/1
TABLET, EXTENDED RELEASE ORAL
Qty: 15 TABLET | Refills: 0 | Status: SHIPPED | OUTPATIENT
Start: 2022-08-15 | End: 2022-08-22 | Stop reason: SDUPTHER

## 2022-08-15 RX ORDER — ROSUVASTATIN CALCIUM 10 MG/1
TABLET, COATED ORAL
Qty: 30 TABLET | Refills: 0 | Status: SHIPPED | OUTPATIENT
Start: 2022-08-15 | End: 2022-08-22 | Stop reason: SDUPTHER

## 2022-08-22 ENCOUNTER — OFFICE VISIT (OUTPATIENT)
Dept: FAMILY MEDICINE CLINIC | Facility: CLINIC | Age: 57
End: 2022-08-22

## 2022-08-22 VITALS — DIASTOLIC BLOOD PRESSURE: 80 MMHG | SYSTOLIC BLOOD PRESSURE: 106 MMHG

## 2022-08-22 DIAGNOSIS — I10 ESSENTIAL HYPERTENSION: Primary | ICD-10-CM

## 2022-08-22 DIAGNOSIS — E78.00 PURE HYPERCHOLESTEROLEMIA: ICD-10-CM

## 2022-08-22 DIAGNOSIS — D72.820 LYMPHOCYTOSIS: ICD-10-CM

## 2022-08-22 PROBLEM — Z86.16 HISTORY OF COVID-19: Status: ACTIVE | Noted: 2022-08-22

## 2022-08-22 PROCEDURE — 99214 OFFICE O/P EST MOD 30 MIN: CPT | Performed by: FAMILY MEDICINE

## 2022-08-22 RX ORDER — ROSUVASTATIN CALCIUM 20 MG/1
20 TABLET, COATED ORAL NIGHTLY
Qty: 90 TABLET | Refills: 2 | Status: SHIPPED | OUTPATIENT
Start: 2022-08-22 | End: 2022-10-14 | Stop reason: SDUPTHER

## 2022-08-22 RX ORDER — TELMISARTAN AND HYDROCHLORTHIAZIDE 80; 25 MG/1; MG/1
1 TABLET ORAL DAILY
Qty: 90 TABLET | Refills: 2 | Status: SHIPPED | OUTPATIENT
Start: 2022-08-22 | End: 2022-10-14 | Stop reason: SDUPTHER

## 2022-08-22 RX ORDER — METOPROLOL SUCCINATE 25 MG/1
12.5 TABLET, EXTENDED RELEASE ORAL DAILY
Qty: 45 TABLET | Refills: 2 | Status: SHIPPED | OUTPATIENT
Start: 2022-08-22 | End: 2022-10-14 | Stop reason: SDUPTHER

## 2022-08-22 NOTE — ASSESSMENT & PLAN NOTE
Lipid abnormalities are unchanged.  Nutritional counseling was provided. and Pharmacotherapy as ordered. Increase dose of rosuvastatin.   Lipids will be reassessed in 3 months.

## 2022-08-22 NOTE — ASSESSMENT & PLAN NOTE
Mild lymphocytosis once again identified on blood work.  Referral to hematology for further evaluation recommended.

## 2022-08-22 NOTE — PROGRESS NOTES
Chief Complaint  Hyperlipidemia    Subjective          Radha presents to [facility]  To review labs and refill medicines.  Her palpitations are improved with the beta-blocker but recently with increased stress she noticed the symptoms again.  Blood pressure is well controlled.  Cholesterol is unchanged and particle numbers are greater than 1000.  The remainder of the labs have been reviewed during today's visit.        Objective   Vital Signs:   Vitals:    08/22/22 1727   BP: 106/80   BP Location: Right arm   Patient Position: Sitting   Cuff Size: Large Adult                Physical Exam  Vitals reviewed.   Constitutional:       General: She is not in acute distress.  Eyes:      General: Lids are normal.      Conjunctiva/sclera: Conjunctivae normal.   Neck:      Vascular: No carotid bruit.      Trachea: No tracheal deviation.   Cardiovascular:      Rate and Rhythm: Normal rate and regular rhythm.      Heart sounds: Normal heart sounds. No murmur heard.  Pulmonary:      Effort: Pulmonary effort is normal.      Breath sounds: Normal breath sounds.   Skin:     General: Skin is warm and dry.   Neurological:      Mental Status: She is alert. She is not disoriented.   Psychiatric:         Speech: Speech normal.         Behavior: Behavior normal. Behavior is cooperative.          Result Review :     The following data was reviewed by: Henry Jacinto MD on 08/22/2022:  Comprehensive Metabolic Panel (08/12/2022 08:37)  CBC & Differential (08/12/2022 08:37)  CK (08/12/2022 08:37)  NMR LipoProfile (08/12/2022 08:37)           Assessment and Plan    Diagnoses and all orders for this visit:    1. Essential hypertension (Primary)  Assessment & Plan:  Hypertension is improving with treatment.  Continue current treatment regimen.  Blood pressure will be reassessed in 3 months.    Orders:  -     telmisartan-hydrochlorothiazide (MICARDIS HCT) 80-25 MG per tablet; Take 1 tablet by mouth Daily for 270 days.   Dispense: 90 tablet; Refill: 2  -     metoprolol succinate XL (TOPROL-XL) 25 MG 24 hr tablet; Take 0.5 tablets by mouth Daily for 270 days.  Dispense: 45 tablet; Refill: 2  -     Comprehensive Metabolic Panel; Future  -     CBC & Differential; Future    2. Lymphocytosis  Assessment & Plan:  Mild lymphocytosis once again identified on blood work.  Referral to hematology for further evaluation recommended.    Orders:  -     Ambulatory Referral to Hematology  -     CBC & Differential; Future    3. Pure hypercholesterolemia  Assessment & Plan:  Lipid abnormalities are unchanged.  Nutritional counseling was provided. and Pharmacotherapy as ordered. Increase dose of rosuvastatin.   Lipids will be reassessed in 3 months.    Orders:  -     rosuvastatin (CRESTOR) 20 MG tablet; Take 1 tablet by mouth Every Night for 270 days.  Dispense: 90 tablet; Refill: 2  -     Lipid Panel With / Chol / HDL Ratio; Future  -     CK; Future      Follow Up   Return in about 3 months (around 11/22/2022) for recheck/refill medication.  Patient was given instructions and counseling regarding her condition or for health maintenance advice. Please see specific information pulled into the AVS if appropriate.

## 2022-09-11 DIAGNOSIS — I10 ESSENTIAL HYPERTENSION: ICD-10-CM

## 2022-09-12 RX ORDER — TELMISARTAN AND HYDROCHLORTHIAZIDE 80; 25 MG/1; MG/1
TABLET ORAL
Qty: 90 TABLET | Refills: 1 | OUTPATIENT
Start: 2022-09-12

## 2022-09-13 DIAGNOSIS — E78.00 PURE HYPERCHOLESTEROLEMIA: ICD-10-CM

## 2022-09-13 DIAGNOSIS — I10 ESSENTIAL HYPERTENSION: ICD-10-CM

## 2022-09-13 RX ORDER — ROSUVASTATIN CALCIUM 10 MG/1
TABLET, COATED ORAL
Qty: 30 TABLET | Refills: 0 | OUTPATIENT
Start: 2022-09-13

## 2022-09-13 RX ORDER — METOPROLOL SUCCINATE 25 MG/1
TABLET, EXTENDED RELEASE ORAL
Qty: 15 TABLET | Refills: 0 | OUTPATIENT
Start: 2022-09-13

## 2022-09-20 NOTE — PROGRESS NOTES
.     REASON FOR CONSULTATION:   Lymphocytosis  Provide an opinion on any further workup or treatment                             REQUESTING PHYSICIAN: Henry Jacinto MD  RECORDS OBTAINED:  Records of the patient's history including those obtained from the referring provider were reviewed and summarized in detail.    HISTORY OF PRESENT ILLNESS:  The patient is a 57 y.o. year old female  who is here for follow-up with the above-mentioned history.     Reviewed last note from PCP, Dr. Henry Jacinto, 2022: He noted mild lymphocytosis and referred patient to us for further evaluation    Lymphocyte percentage is normal.  However, ALC slightly increased    No fever, chills, weight loss.  She states she has had non- drenching night sweats every night, for the past 6 years or so.  Unchanged.  No complaints of recurrent or unusual infections.    Past Medical History:   Diagnosis Date   • Allergic    • Arthritis    • GERD (gastroesophageal reflux disease)    • H/O Anal skin tag    • History of colon polyp    • History of snoring    • Hypertension    • LBBB (left bundle branch block)    • Migraine    • PVC's (premature ventricular contractions)    • Raynaud's syndrome    • Seasonal allergies    • Sensory neuropathy, positional, left upper extremity 2018     Past Surgical History:   Procedure Laterality Date   •  SECTION      failure to progress   • COLONOSCOPY      One polyp removed       MEDICATIONS    Current Outpatient Medications:   •  Calcium Carbonate-Vitamin D3 600-400 MG-UNIT tablet, Take 2 tablets by mouth Daily., Disp: , Rfl:   •  lansoprazole (PREVACID) 15 MG capsule, Take 15 mg by mouth Before Breakfast., Disp: , Rfl:   •  metoprolol succinate XL (TOPROL-XL) 25 MG 24 hr tablet, Take 0.5 tablets by mouth Daily for 270 days., Disp: 45 tablet, Rfl: 2  •  Multiple Vitamins-Minerals (MULTIVITAMIN ADULT PO), Take  by mouth., Disp: , Rfl:   •  Pirmella 1 1-35 MG-MCG per tablet, , Disp: ,  "Rfl:   •  rosuvastatin (CRESTOR) 20 MG tablet, Take 1 tablet by mouth Every Night for 270 days., Disp: 90 tablet, Rfl: 2  •  telmisartan-hydrochlorothiazide (MICARDIS HCT) 80-25 MG per tablet, Take 1 tablet by mouth Daily for 270 days., Disp: 90 tablet, Rfl: 2  •  predniSONE (DELTASONE) 10 MG (21) dose pack, Take  by mouth Daily. Use as directed on package, Disp: 21 each, Rfl: 0    ALLERGIES:   No Known Allergies    SOCIAL HISTORY:       Social History     Socioeconomic History   • Marital status:      Spouse name: Khang   • Number of children: 1   Tobacco Use   • Smoking status: Never Smoker   • Smokeless tobacco: Never Used   Vaping Use   • Vaping Use: Never used   Substance and Sexual Activity   • Alcohol use: Yes     Alcohol/week: 2.0 standard drinks     Types: 2 Glasses of wine per week     Comment: occasionally.   • Drug use: No   • Sexual activity: Yes     Partners: Male     Birth control/protection: OCP         FAMILY HISTORY:  Family History   Problem Relation Age of Onset   • Diabetes type II Mother    • Hypertension Father    • Hyperlipidemia Father        REVIEW OF SYSTEMS:  Review of Systems   Constitutional: Negative for activity change.   HENT: Negative for nosebleeds and trouble swallowing.    Respiratory: Negative for shortness of breath and wheezing.    Cardiovascular: Negative for chest pain and palpitations.   Gastrointestinal: Negative for constipation, diarrhea and nausea.   Genitourinary: Negative for dysuria and hematuria.   Musculoskeletal: Negative for arthralgias and myalgias.   Skin: Negative for rash and wound.   Neurological: Negative for seizures and syncope.   Hematological: Negative for adenopathy. Does not bruise/bleed easily.   Psychiatric/Behavioral: Negative for confusion.              Vitals:    09/21/22 1017   BP: 132/88   Pulse: 112   Resp: 16   Temp: 97.1 °F (36.2 °C)   TempSrc: Temporal   SpO2: 100%   Weight: 67.4 kg (148 lb 8 oz)   Height: 153.7 cm (60.51\")   PainSc: " 0-No pain     Current Status 9/21/2022   ECOG score 0      PHYSICAL EXAM:        CONSTITUTIONAL:  Vital signs reviewed.  No distress, looks comfortable.  EYES:  Conjunctiva and lids unremarkable.  PERRLA  EARS,NOSE,MOUTH,THROAT:  Ears and nose appear unremarkable.  Lips, teeth, gums appear unremarkable.  RESPIRATORY:  Normal respiratory effort.  Lungs clear to auscultation bilaterally.  CARDIOVASCULAR:  Normal S1, S2.  No murmurs rubs or gallops.  No significant lower extremity edema.  GASTROINTESTINAL: Abdomen appears unremarkable.  Nontender.  No hepatomegaly.  No splenomegaly.  LYMPHATIC:  No cervical, supraclavicular, axillary lymphadenopathy.  SKIN:  Warm.  No rashes.  PSYCHIATRIC:  Normal judgment and insight.  Normal mood and affect.          RECENT LABS:        WBC   Date Value Ref Range Status   09/21/2022 10.65 3.40 - 10.80 10*3/mm3 Final   08/12/2022 11.5 (H) 3.4 - 10.8 x10E3/uL Final   02/24/2022 10.0 3.4 - 10.8 x10E3/uL Final   02/16/2022 11.53 (H) 3.40 - 10.80 10*3/mm3 Final   06/21/2021 7.5 3.4 - 10.8 x10E3/uL Final   03/12/2021 6.54 3.40 - 10.80 10*3/mm3 Final   03/11/2020 6.4 3.4 - 10.8 x10E3/uL Final   03/28/2019 8.20 3.40 - 10.80 10*3/mm3 Final   04/06/2018 9.62 4.50 - 10.70 10*3/mm3 Final     Hemoglobin   Date Value Ref Range Status   09/21/2022 14.0 12.0 - 15.9 g/dL Final   08/12/2022 13.9 11.1 - 15.9 g/dL Final   02/24/2022 12.5 11.1 - 15.9 g/dL Final   02/16/2022 14.5 12.0 - 15.9 g/dL Final   06/21/2021 11.9 11.1 - 15.9 g/dL Final   03/12/2021 14.8 12.0 - 15.9 g/dL Final   03/11/2020 12.9 11.1 - 15.9 g/dL Final   03/28/2019 13.0 12.0 - 15.9 g/dL Final   04/06/2018 13.6 11.9 - 15.5 g/dL Final     Platelets   Date Value Ref Range Status   09/21/2022 232 140 - 450 10*3/mm3 Final   08/12/2022 307 150 - 450 x10E3/uL Final   02/24/2022 251 150 - 450 x10E3/uL Final   02/16/2022 329 140 - 450 10*3/mm3 Final   06/21/2021 219 150 - 450 x10E3/uL Final   03/12/2021 291 140 - 450 10*3/mm3 Final    03/11/2020 273 150 - 450 x10E3/uL Final   03/28/2019 269 140 - 450 10*3/mm3 Final   04/06/2018 286 140 - 500 10*3/mm3 Final       Assessment & Plan   Lymphocytosis  - Flow Cytometry, Blood        Radha NICOLE Higgins   *Lymphocytosis  · Although lymphocyte percent is normal, ALC intermittently elevated and when not elevated, usually upper limits of normal    *Leukocytosis, intermittent  · WBC was mostly 6.5-9.5 April 2018-June 2021.  · WBC 10-11.5 since February 2022     *Etiology of the above  · Hb and PLT unremarkable.  I told patient and  this could be a very early CLL or lymphoma involving the blood.  I told her if this is the case, I would not expect it to need any treatment.    Plan  MD CBC roughly 1 week  Peripheral blood flow today  She told me if the peripheral blood flow was negative, she still wants me to follow her, every 6 to 12 months.  I am happy to do this     assisted with history.  Chart reviewed and summarized.

## 2022-09-21 ENCOUNTER — CONSULT (OUTPATIENT)
Dept: ONCOLOGY | Facility: CLINIC | Age: 57
End: 2022-09-21

## 2022-09-21 ENCOUNTER — LAB (OUTPATIENT)
Dept: LAB | Facility: HOSPITAL | Age: 57
End: 2022-09-21

## 2022-09-21 VITALS
WEIGHT: 148.5 LBS | BODY MASS INDEX: 28.04 KG/M2 | HEART RATE: 112 BPM | HEIGHT: 61 IN | RESPIRATION RATE: 16 BRPM | OXYGEN SATURATION: 100 % | TEMPERATURE: 97.1 F | SYSTOLIC BLOOD PRESSURE: 132 MMHG | DIASTOLIC BLOOD PRESSURE: 88 MMHG

## 2022-09-21 DIAGNOSIS — D72.820 LYMPHOCYTOSIS: Primary | ICD-10-CM

## 2022-09-21 LAB
BASOPHILS # BLD AUTO: 0.06 10*3/MM3 (ref 0–0.2)
BASOPHILS NFR BLD AUTO: 0.6 % (ref 0–1.5)
DEPRECATED RDW RBC AUTO: 39.8 FL (ref 37–54)
EOSINOPHIL # BLD AUTO: 0.06 10*3/MM3 (ref 0–0.4)
EOSINOPHIL NFR BLD AUTO: 0.6 % (ref 0.3–6.2)
ERYTHROCYTE [DISTWIDTH] IN BLOOD BY AUTOMATED COUNT: 11.5 % (ref 12.3–15.4)
HCT VFR BLD AUTO: 40.2 % (ref 34–46.6)
HGB BLD-MCNC: 14 G/DL (ref 12–15.9)
IMM GRANULOCYTES # BLD AUTO: 0.04 10*3/MM3 (ref 0–0.05)
IMM GRANULOCYTES NFR BLD AUTO: 0.4 % (ref 0–0.5)
LYMPHOCYTES # BLD AUTO: 3.02 10*3/MM3 (ref 0.7–3.1)
LYMPHOCYTES NFR BLD AUTO: 28.4 % (ref 19.6–45.3)
MCH RBC QN AUTO: 32.9 PG (ref 26.6–33)
MCHC RBC AUTO-ENTMCNC: 34.8 G/DL (ref 31.5–35.7)
MCV RBC AUTO: 94.6 FL (ref 79–97)
MONOCYTES # BLD AUTO: 0.59 10*3/MM3 (ref 0.1–0.9)
MONOCYTES NFR BLD AUTO: 5.5 % (ref 5–12)
NEUTROPHILS NFR BLD AUTO: 6.88 10*3/MM3 (ref 1.7–7)
NEUTROPHILS NFR BLD AUTO: 64.5 % (ref 42.7–76)
NRBC BLD AUTO-RTO: 0 /100 WBC (ref 0–0.2)
PLATELET # BLD AUTO: 232 10*3/MM3 (ref 140–450)
PMV BLD AUTO: 11.4 FL (ref 6–12)
RBC # BLD AUTO: 4.25 10*6/MM3 (ref 3.77–5.28)
WBC NRBC COR # BLD: 10.65 10*3/MM3 (ref 3.4–10.8)

## 2022-09-21 PROCEDURE — 88182 CELL MARKER STUDY: CPT

## 2022-09-21 PROCEDURE — 85025 COMPLETE CBC W/AUTO DIFF WBC: CPT

## 2022-09-21 PROCEDURE — 99204 OFFICE O/P NEW MOD 45 MIN: CPT | Performed by: INTERNAL MEDICINE

## 2022-09-21 PROCEDURE — 88185 FLOWCYTOMETRY/TC ADD-ON: CPT

## 2022-09-21 PROCEDURE — 36415 COLL VENOUS BLD VENIPUNCTURE: CPT

## 2022-09-21 PROCEDURE — 88184 FLOWCYTOMETRY/ TC 1 MARKER: CPT

## 2022-09-23 LAB — REF LAB TEST METHOD: NORMAL

## 2022-09-26 NOTE — PROGRESS NOTES
.     REASON FOR FOLLOWUP : Leukocytosis and lymphocytosis    HISTORY OF PRESENT ILLNESS:  The patient is a 57 y.o. year old female  who is here for follow-up with the above-mentioned history.     No new problems.  Feeling fine.  No fever, chills, weight loss, or drenching night sweats    Past Medical History:   Diagnosis Date   • Allergic    • Arthritis    • GERD (gastroesophageal reflux disease)    • H/O Anal skin tag    • History of colon polyp    • History of snoring    • Hypertension    • LBBB (left bundle branch block)    • Migraine    • PVC's (premature ventricular contractions)    • Raynaud's syndrome    • Seasonal allergies    • Sensory neuropathy, positional, left upper extremity 2018     Past Surgical History:   Procedure Laterality Date   •  SECTION      failure to progress   • COLONOSCOPY      One polyp removed       MEDICATIONS    Current Outpatient Medications:   •  Calcium Carbonate-Vitamin D3 600-400 MG-UNIT tablet, Take 2 tablets by mouth Daily., Disp: , Rfl:   •  lansoprazole (PREVACID) 15 MG capsule, Take 15 mg by mouth Before Breakfast., Disp: , Rfl:   •  metoprolol succinate XL (TOPROL-XL) 25 MG 24 hr tablet, Take 0.5 tablets by mouth Daily for 270 days., Disp: 45 tablet, Rfl: 2  •  Multiple Vitamins-Minerals (MULTIVITAMIN ADULT PO), Take  by mouth., Disp: , Rfl:   •  Pirmella 1/35 1-35 MG-MCG per tablet, , Disp: , Rfl:   •  rosuvastatin (CRESTOR) 20 MG tablet, Take 1 tablet by mouth Every Night for 270 days., Disp: 90 tablet, Rfl: 2  •  telmisartan-hydrochlorothiazide (MICARDIS HCT) 80-25 MG per tablet, Take 1 tablet by mouth Daily for 270 days., Disp: 90 tablet, Rfl: 2  •  predniSONE (DELTASONE) 10 MG (21) dose pack, Take  by mouth Daily. Use as directed on package, Disp: 21 each, Rfl: 0    ALLERGIES:   No Known Allergies    SOCIAL HISTORY:       Social History     Socioeconomic History   • Marital status:      Spouse name: Khang   • Number of children: 1  "  Tobacco Use   • Smoking status: Never Smoker   • Smokeless tobacco: Never Used   Vaping Use   • Vaping Use: Never used   Substance and Sexual Activity   • Alcohol use: Yes     Alcohol/week: 2.0 standard drinks     Types: 2 Glasses of wine per week     Comment: occasionally.   • Drug use: No   • Sexual activity: Yes     Partners: Male     Birth control/protection: OCP         FAMILY HISTORY:  Family History   Problem Relation Age of Onset   • Diabetes Mother    • Diabetes type II Mother    • Hypertension Father    • Hyperlipidemia Father        REVIEW OF SYSTEMS:  Review of Systems   Constitutional: Negative for activity change.   HENT: Negative for nosebleeds and trouble swallowing.    Respiratory: Negative for shortness of breath and wheezing.    Cardiovascular: Negative for chest pain and palpitations.   Gastrointestinal: Negative for constipation, diarrhea and nausea.   Genitourinary: Negative for dysuria and hematuria.   Musculoskeletal: Negative for arthralgias and myalgias.   Skin: Negative for rash and wound.   Neurological: Negative for seizures and syncope.   Hematological: Negative for adenopathy. Does not bruise/bleed easily.   Psychiatric/Behavioral: Negative for confusion.              Vitals:    09/27/22 1236   BP: 150/88   Pulse: 83   Resp: 16   Temp: 98.2 °F (36.8 °C)   TempSrc: Temporal   SpO2: 98%   Weight: 68 kg (150 lb)   Height: 153.7 cm (60.51\")   PainSc: 0-No pain     Current Status 9/27/2022   ECOG score 0      PHYSICAL EXAM:          CONSTITUTIONAL:  Vital signs reviewed.  No distress, looks comfortable.  EYES:  Conjunctiva and lids unremarkable.  PERRLA  EARS,NOSE,MOUTH,THROAT:  Ears and nose appear unremarkable.  Lips, teeth, gums appear unremarkable.  RESPIRATORY:  Normal respiratory effort.  Lungs clear to auscultation bilaterally.  CARDIOVASCULAR:  Normal S1, S2.  No murmurs rubs or gallops.  No significant lower extremity edema.  GASTROINTESTINAL: Abdomen appears unremarkable.  " Nontender.  No hepatomegaly.  No splenomegaly.  LYMPHATIC:  No cervical, supraclavicular, axillary lymphadenopathy.  SKIN:  Warm.  No rashes.  PSYCHIATRIC:  Normal judgment and insight.  Normal mood and affect.         RECENT LABS:        WBC   Date Value Ref Range Status   09/27/2022 12.03 (H) 3.40 - 10.80 10*3/mm3 Final   09/21/2022 10.65 3.40 - 10.80 10*3/mm3 Final   08/12/2022 11.5 (H) 3.4 - 10.8 x10E3/uL Final   02/24/2022 10.0 3.4 - 10.8 x10E3/uL Final   02/16/2022 11.53 (H) 3.40 - 10.80 10*3/mm3 Final   06/21/2021 7.5 3.4 - 10.8 x10E3/uL Final   03/12/2021 6.54 3.40 - 10.80 10*3/mm3 Final   03/11/2020 6.4 3.4 - 10.8 x10E3/uL Final   03/28/2019 8.20 3.40 - 10.80 10*3/mm3 Final   04/06/2018 9.62 4.50 - 10.70 10*3/mm3 Final     Hemoglobin   Date Value Ref Range Status   09/27/2022 14.0 12.0 - 15.9 g/dL Final   09/21/2022 14.0 12.0 - 15.9 g/dL Final   08/12/2022 13.9 11.1 - 15.9 g/dL Final   02/24/2022 12.5 11.1 - 15.9 g/dL Final   02/16/2022 14.5 12.0 - 15.9 g/dL Final   06/21/2021 11.9 11.1 - 15.9 g/dL Final   03/12/2021 14.8 12.0 - 15.9 g/dL Final   03/11/2020 12.9 11.1 - 15.9 g/dL Final   03/28/2019 13.0 12.0 - 15.9 g/dL Final   04/06/2018 13.6 11.9 - 15.5 g/dL Final     Platelets   Date Value Ref Range Status   09/27/2022 252 140 - 450 10*3/mm3 Final   09/21/2022 232 140 - 450 10*3/mm3 Final   08/12/2022 307 150 - 450 x10E3/uL Final   02/24/2022 251 150 - 450 x10E3/uL Final   02/16/2022 329 140 - 450 10*3/mm3 Final   06/21/2021 219 150 - 450 x10E3/uL Final   03/12/2021 291 140 - 450 10*3/mm3 Final   03/11/2020 273 150 - 450 x10E3/uL Final   03/28/2019 269 140 - 450 10*3/mm3 Final   04/06/2018 286 140 - 500 10*3/mm3 Final       Assessment & Plan   Lymphocytosis  - CBC & Differential        Radha E Higgins   *Lymphocytosis  · Although lymphocyte percent is normal, ALC intermittently elevated and when not elevated, usually upper limits of normal  ALC 3970    *Leukocytosis, intermittent  · WBC was mostly 6.5-9.5  April 2018-June 2021.  · WBC 10-11.5 since February 2022   WBC 12    *Etiology of the above  · Hb and PLT unremarkable.  I told patient and  this could be a very early CLL or lymphoma involving the blood.  I told her if this is the case, I would not expect it to need any treatment.  · Peripheral blood flow 9/21/2022: Negative    Plan  · MD CBC 6 months  · She would like to maintain follow-up with me every 6 to 12 months long-term

## 2022-09-27 ENCOUNTER — OFFICE VISIT (OUTPATIENT)
Dept: ONCOLOGY | Facility: CLINIC | Age: 57
End: 2022-09-27

## 2022-09-27 ENCOUNTER — LAB (OUTPATIENT)
Dept: OTHER | Facility: HOSPITAL | Age: 57
End: 2022-09-27

## 2022-09-27 VITALS
RESPIRATION RATE: 16 BRPM | WEIGHT: 150 LBS | DIASTOLIC BLOOD PRESSURE: 88 MMHG | HEIGHT: 61 IN | BODY MASS INDEX: 28.32 KG/M2 | TEMPERATURE: 98.2 F | OXYGEN SATURATION: 98 % | HEART RATE: 83 BPM | SYSTOLIC BLOOD PRESSURE: 150 MMHG

## 2022-09-27 DIAGNOSIS — D72.820 LYMPHOCYTOSIS: ICD-10-CM

## 2022-09-27 DIAGNOSIS — D72.820 LYMPHOCYTOSIS: Primary | ICD-10-CM

## 2022-09-27 LAB
BASOPHILS # BLD AUTO: 0.08 10*3/MM3 (ref 0–0.2)
BASOPHILS NFR BLD AUTO: 0.7 % (ref 0–1.5)
DEPRECATED RDW RBC AUTO: 39.7 FL (ref 37–54)
EOSINOPHIL # BLD AUTO: 0.1 10*3/MM3 (ref 0–0.4)
EOSINOPHIL NFR BLD AUTO: 0.8 % (ref 0.3–6.2)
ERYTHROCYTE [DISTWIDTH] IN BLOOD BY AUTOMATED COUNT: 11.4 % (ref 12.3–15.4)
HCT VFR BLD AUTO: 41.3 % (ref 34–46.6)
HGB BLD-MCNC: 14 G/DL (ref 12–15.9)
IMM GRANULOCYTES # BLD AUTO: 0.04 10*3/MM3 (ref 0–0.05)
IMM GRANULOCYTES NFR BLD AUTO: 0.3 % (ref 0–0.5)
LYMPHOCYTES # BLD AUTO: 3.97 10*3/MM3 (ref 0.7–3.1)
LYMPHOCYTES NFR BLD AUTO: 33 % (ref 19.6–45.3)
MCH RBC QN AUTO: 32 PG (ref 26.6–33)
MCHC RBC AUTO-ENTMCNC: 33.9 G/DL (ref 31.5–35.7)
MCV RBC AUTO: 94.3 FL (ref 79–97)
MONOCYTES # BLD AUTO: 0.82 10*3/MM3 (ref 0.1–0.9)
MONOCYTES NFR BLD AUTO: 6.8 % (ref 5–12)
NEUTROPHILS NFR BLD AUTO: 58.4 % (ref 42.7–76)
NEUTROPHILS NFR BLD AUTO: 7.02 10*3/MM3 (ref 1.7–7)
NRBC BLD AUTO-RTO: 0 /100 WBC (ref 0–0.2)
PLATELET # BLD AUTO: 252 10*3/MM3 (ref 140–450)
PMV BLD AUTO: 11.7 FL (ref 6–12)
RBC # BLD AUTO: 4.38 10*6/MM3 (ref 3.77–5.28)
WBC NRBC COR # BLD: 12.03 10*3/MM3 (ref 3.4–10.8)

## 2022-09-27 PROCEDURE — 99213 OFFICE O/P EST LOW 20 MIN: CPT | Performed by: INTERNAL MEDICINE

## 2022-09-27 PROCEDURE — 36415 COLL VENOUS BLD VENIPUNCTURE: CPT

## 2022-09-27 PROCEDURE — 85025 COMPLETE CBC W/AUTO DIFF WBC: CPT | Performed by: INTERNAL MEDICINE

## 2022-10-12 ENCOUNTER — OFFICE VISIT (OUTPATIENT)
Dept: CARDIOLOGY | Facility: CLINIC | Age: 57
End: 2022-10-12

## 2022-10-12 VITALS
WEIGHT: 149 LBS | OXYGEN SATURATION: 99 % | HEART RATE: 95 BPM | HEIGHT: 60 IN | BODY MASS INDEX: 29.25 KG/M2 | DIASTOLIC BLOOD PRESSURE: 76 MMHG | SYSTOLIC BLOOD PRESSURE: 136 MMHG

## 2022-10-12 DIAGNOSIS — E78.00 PURE HYPERCHOLESTEROLEMIA: ICD-10-CM

## 2022-10-12 DIAGNOSIS — I47.1 PSVT (PAROXYSMAL SUPRAVENTRICULAR TACHYCARDIA): ICD-10-CM

## 2022-10-12 DIAGNOSIS — I44.7 LBBB (LEFT BUNDLE BRANCH BLOCK): ICD-10-CM

## 2022-10-12 DIAGNOSIS — I10 ESSENTIAL HYPERTENSION: ICD-10-CM

## 2022-10-12 DIAGNOSIS — I49.3 PVC (PREMATURE VENTRICULAR CONTRACTION): Primary | ICD-10-CM

## 2022-10-12 PROCEDURE — 99214 OFFICE O/P EST MOD 30 MIN: CPT | Performed by: NURSE PRACTITIONER

## 2022-10-12 PROCEDURE — 93000 ELECTROCARDIOGRAM COMPLETE: CPT | Performed by: NURSE PRACTITIONER

## 2022-10-12 NOTE — PROGRESS NOTES
[facility] CARDIOLOGY  [address]  [facility] [address]  [facility] [address]  Phone: [phone]      Patient Name: Radha Higgins  :1965  Age: 57 y.o.  Primary Cardiologist: Claire Kaur MD  Encounter Provider:  FLACA Gan      Chief Complaint     Chief Complaint: Hypertension and Follow-up      SUBJECTIVE     History of Present Illness:  Radha Higgins is a 57 y.o.  female whose medical history includes hypertension.  She has been followed in our office by Dr. Conklin for palpitations and PVCs; she is now transitioning to Dr. Kaur's care.      10/16/22 Follow-up:  She is here for 6-month follow-up and I am seeing her for the first time today.  Her palpitations have been better over the last 6 months.  She mostly has them if she lays on her left side in bed or she has increased stress.  Note is having palpitations over the weekend.  She denies chest pain, dyspnea with exertion, orthopnea, leg swelling, or syncope.    Below is a summary of pertinent cardiology findings:  • Palpitations: 2022 she began noticing palpitations that felt like a fluttering sensation and sometimes a pounding sensation in her chest.  These are brief in nature and occur when laying down.  They are made worse by anxiety.  • 2022 emergency rooms visit EKG showed PVCs and left bundle branch block.  • 2022 13-day heart monitor showed the predominant rhythm to be sinus rhythm with average heart rate 85 bpm.  There were rare PACs, 3 episodes of nonsustained SVT lasting about 8 beats, and frequent PVCs (5.6% burden); palpitations correlated with all of these.  She is treated with 12.5 mg metoprolol succinate daily.  • 2022 echocardiogram showed EF 62.5%, grade 2 with high LAP LV diastolic dysfunction, negative saline test results, and trace mitral and tricuspid valve regurgitation.    Past Medical History:   Diagnosis Date   • Allergic    • Arthritis    •  "GERD (gastroesophageal reflux disease)    • H/O Anal skin tag    • History of colon polyp    • History of snoring    • Hypertension    • LBBB (left bundle branch block)    • Migraine    • PVC's (premature ventricular contractions)    • Raynaud's syndrome    • Seasonal allergies    • Sensory neuropathy, positional, left upper extremity 2018       Past Surgical History:  •  has a past surgical history that includes  section () and Colonoscopy ().     Social History     Socioeconomic History   • Marital status:      Spouse name: Khang   • Number of children: 1   Tobacco Use   • Smoking status: Never   • Smokeless tobacco: Never   Vaping Use   • Vaping Use: Never used   Substance and Sexual Activity   • Alcohol use: Yes     Alcohol/week: 2.0 standard drinks     Types: 2 Glasses of wine per week     Comment: occasionally.   • Drug use: No   • Sexual activity: Yes     Partners: Male     Birth control/protection: OCP         Review of Systems     Review of Systems   Cardiovascular: Negative.        Medications     Allergies as of 10/12/2022   • (No Known Allergies)       Current Outpatient Medications on File Prior to Visit   Medication Sig   • Calcium Carbonate-Vitamin D3 600-400 MG-UNIT tablet Take 2 tablets by mouth Daily.   • lansoprazole (PREVACID) 15 MG capsule Take 15 mg by mouth Before Breakfast.   • Multiple Vitamins-Minerals (MULTIVITAMIN ADULT PO) Take  by mouth.   • Pirmella 1/35 1-35 MG-MCG per tablet      No current facility-administered medications on file prior to visit.          OBJECTIVE     Vital Signs:   /76   Pulse 95   Ht 152.4 cm (60\")   Wt 67.6 kg (149 lb)   SpO2 99%   BMI 29.10 kg/m²       Weight:  Wt Readings from Last 3 Encounters:   10/12/22 67.6 kg (149 lb)   22 68 kg (150 lb)   22 67.4 kg (148 lb 8 oz)     Body mass index is 29.1 kg/m².      Physical Exam     Physical Exam  Constitutional:       General: She is not in acute distress.  HENT:    "   Head: Normocephalic and atraumatic.      Mouth/Throat:      Mouth: Mucous membranes are moist.   Eyes:      General: No scleral icterus.     Extraocular Movements: Extraocular movements intact.      Conjunctiva/sclera: Conjunctivae normal.      Pupils: Pupils are equal, round, and reactive to light.   Cardiovascular:      Rate and Rhythm: Normal rate and regular rhythm.      Pulses: Normal pulses.      Heart sounds: S1 normal and S2 normal.   Pulmonary:      Effort: No respiratory distress.      Breath sounds: Normal breath sounds. No wheezing, rhonchi or rales.   Abdominal:      General: Bowel sounds are normal. There is no distension.      Palpations: Abdomen is soft.      Tenderness: There is no abdominal tenderness.   Musculoskeletal:         General: Normal range of motion.      Cervical back: Normal range of motion and neck supple.      Right lower leg: No edema.      Left lower leg: No edema.   Skin:     General: Skin is warm and dry.      Coloration: Skin is not jaundiced.   Neurological:      Mental Status: She is alert and oriented to person, place, and time.   Psychiatric:         Mood and Affect: Mood normal.         Reviewed Data     Result Review :  The following data was reviewed by FLACA Gan on 10/16/22:  • Labs 08/12/2022:  cr 0.9, K 4.4, otherwise unremarkable CMP, Hgb 13.9, Plt 307, Chol 181, HDL 50, LDL 92, Trig 112      ECG 12 Lead    Date/Time: 10/12/2022 10:19 AM  Performed by: Leia Mayfield APRN  Authorized by: Leia Mayfield APRN   Comparison: compared with previous ECG from 2/16/2022  Similar to previous ECG  Rhythm: sinus rhythm  Ectopy: unifocal PVCs  Rate: normal  BPM: 95  Conduction: left bundle branch block    Clinical impression: abnormal EKG            Assessment and Plan        Assessment and Plan     Assessment:  1. PVC (premature ventricular contraction)    2. PSVT (paroxysmal supraventricular tachycardia) (HCC)    3. LBBB (left  bundle branch block)    4. Essential hypertension    5. Pure hypercholesterolemia         1. PVCs: She presented to the emergency room in February 2022 and EKG showed PVCs and left bundle branch block.  13-day heart monitor in March 2022 showed a 5.6% PVC burden.  These correlated with palpitations.  She is treated with 12.5 mg metoprolol succinate daily which is helped.  2. PSVT: 13-day heart monitor in March 2022 showed 3 episodes of nonsustained SVT lasting about 8 beats; palpitations also correlated with PSVT.  She is treated with 12.5 mg metoprolol succinate daily.  3. Left bundle branch block.  Echocardiogram in March 2022 showed EF 62.5%, grade 2 LV diastolic dysfunction with high LAP, and trace mitral and tricuspid insufficiency.  She denies chest pain.  Her ECG is stable.  4. Hypertension controlled on telmisartan-HCTZ and metoprolol succinate.  5. Hyperlipidemia: She is treated with 20 mg Crestor daily.      Plan:  1. I made no medication changes today.  We discussed doing a treadmill stress test at palpitations become worse.  She does not feel this is needed at this time.  2. She will follow-up with Dr. Kaur in 6 months.      Follow Up:  Return in about 6 months (around 4/12/2023) for Follow-up with Dr. Kaur.  Orders Placed This Encounter   Procedures   • ECG 12 Lead   • SCANNED EKG      No orders of the defined types were placed in this encounter.        Thank you the opportunity to participate in this patient's care.    FLACA Tavarez    This office note has been dictated.

## 2022-10-14 ENCOUNTER — TELEPHONE (OUTPATIENT)
Dept: FAMILY MEDICINE CLINIC | Facility: CLINIC | Age: 57
End: 2022-10-14

## 2022-10-14 DIAGNOSIS — E78.00 PURE HYPERCHOLESTEROLEMIA: ICD-10-CM

## 2022-10-14 DIAGNOSIS — I10 ESSENTIAL HYPERTENSION: ICD-10-CM

## 2022-10-14 RX ORDER — TELMISARTAN AND HYDROCHLORTHIAZIDE 80; 25 MG/1; MG/1
1 TABLET ORAL DAILY
Qty: 90 TABLET | Refills: 1 | Status: SHIPPED | OUTPATIENT
Start: 2022-10-14 | End: 2022-11-30 | Stop reason: SDUPTHER

## 2022-10-14 RX ORDER — ROSUVASTATIN CALCIUM 20 MG/1
20 TABLET, COATED ORAL NIGHTLY
Qty: 90 TABLET | Refills: 1 | Status: SHIPPED | OUTPATIENT
Start: 2022-10-14 | End: 2022-11-30 | Stop reason: SDUPTHER

## 2022-10-14 RX ORDER — METOPROLOL SUCCINATE 25 MG/1
12.5 TABLET, EXTENDED RELEASE ORAL DAILY
Qty: 45 TABLET | Refills: 1 | Status: SHIPPED | OUTPATIENT
Start: 2022-10-14 | End: 2022-11-30 | Stop reason: SDUPTHER

## 2022-10-14 NOTE — TELEPHONE ENCOUNTER
Caller: Radha Higgins    Relationship: Self    Best call back number: [phone]    Requested Prescriptions:   Requested Prescriptions     Pending Prescriptions Disp Refills   • metoprolol succinate XL (TOPROL-XL) 25 MG 24 hr tablet 45 tablet 2     Sig: Take 0.5 tablets by mouth Daily for 270 days.   • telmisartan-hydrochlorothiazide (MICARDIS HCT) 80-25 MG per tablet 90 tablet 2     Sig: Take 1 tablet by mouth Daily for 270 days.   • rosuvastatin (CRESTOR) 20 MG tablet 90 tablet 2     Sig: Take 1 tablet by mouth Every Night for 270 days.        Pharmacy where request should be sent: [facility] - [address] - [phone]  - [phone] FX     Additional details provided by patient: PLEASE SEND TO MAIL ORDER PHARMACY     Does the patient have less than a 3 day supply:  [] Yes  [x] No    Pedro Wilkins Rep   10/14/22 08:27 EDT

## 2022-11-30 ENCOUNTER — OFFICE VISIT (OUTPATIENT)
Dept: FAMILY MEDICINE CLINIC | Facility: CLINIC | Age: 57
End: 2022-11-30

## 2022-11-30 VITALS
TEMPERATURE: 98.7 F | DIASTOLIC BLOOD PRESSURE: 84 MMHG | HEIGHT: 60 IN | WEIGHT: 150 LBS | BODY MASS INDEX: 29.45 KG/M2 | HEART RATE: 84 BPM | OXYGEN SATURATION: 100 % | RESPIRATION RATE: 18 BRPM | SYSTOLIC BLOOD PRESSURE: 132 MMHG

## 2022-11-30 DIAGNOSIS — E78.00 PURE HYPERCHOLESTEROLEMIA: ICD-10-CM

## 2022-11-30 DIAGNOSIS — Z23 IMMUNIZATION DUE: ICD-10-CM

## 2022-11-30 DIAGNOSIS — Z23 NEED FOR INFLUENZA VACCINATION: ICD-10-CM

## 2022-11-30 DIAGNOSIS — I10 ESSENTIAL HYPERTENSION: Primary | ICD-10-CM

## 2022-11-30 PROCEDURE — 90686 IIV4 VACC NO PRSV 0.5 ML IM: CPT | Performed by: FAMILY MEDICINE

## 2022-11-30 PROCEDURE — 90471 IMMUNIZATION ADMIN: CPT | Performed by: FAMILY MEDICINE

## 2022-11-30 PROCEDURE — 99214 OFFICE O/P EST MOD 30 MIN: CPT | Performed by: FAMILY MEDICINE

## 2022-11-30 RX ORDER — TELMISARTAN AND HYDROCHLORTHIAZIDE 80; 25 MG/1; MG/1
1 TABLET ORAL DAILY
Qty: 90 TABLET | Refills: 3 | Status: SHIPPED | OUTPATIENT
Start: 2022-11-30 | End: 2023-11-30

## 2022-11-30 RX ORDER — METOPROLOL SUCCINATE 25 MG/1
12.5 TABLET, EXTENDED RELEASE ORAL DAILY
Qty: 45 TABLET | Refills: 3 | Status: SHIPPED | OUTPATIENT
Start: 2022-11-30 | End: 2023-11-30

## 2022-11-30 RX ORDER — ESTRADIOL/NORETHINDRONE ACETATE TRANSDERMAL SYSTEM .05; .25 MG/D; MG/D
PATCH, EXTENDED RELEASE TRANSDERMAL
COMMUNITY
Start: 2022-11-14

## 2022-11-30 RX ORDER — ROSUVASTATIN CALCIUM 20 MG/1
20 TABLET, COATED ORAL NIGHTLY
Qty: 90 TABLET | Refills: 3 | Status: SHIPPED | OUTPATIENT
Start: 2022-11-30 | End: 2023-11-30

## 2022-12-20 DIAGNOSIS — E78.00 PURE HYPERCHOLESTEROLEMIA: ICD-10-CM

## 2022-12-20 RX ORDER — ROSUVASTATIN CALCIUM 10 MG/1
TABLET, COATED ORAL
Qty: 30 TABLET | Refills: 0 | OUTPATIENT
Start: 2022-12-20

## 2023-03-28 ENCOUNTER — OFFICE VISIT (OUTPATIENT)
Dept: ONCOLOGY | Facility: CLINIC | Age: 58
End: 2023-03-28
Payer: COMMERCIAL

## 2023-03-28 ENCOUNTER — LAB (OUTPATIENT)
Dept: OTHER | Facility: HOSPITAL | Age: 58
End: 2023-03-28
Payer: COMMERCIAL

## 2023-03-28 VITALS
RESPIRATION RATE: 16 BRPM | TEMPERATURE: 97.8 F | OXYGEN SATURATION: 100 % | BODY MASS INDEX: 29.41 KG/M2 | WEIGHT: 149.8 LBS | HEART RATE: 79 BPM | HEIGHT: 60 IN | DIASTOLIC BLOOD PRESSURE: 97 MMHG | SYSTOLIC BLOOD PRESSURE: 141 MMHG

## 2023-03-28 DIAGNOSIS — D72.820 LYMPHOCYTOSIS: Primary | ICD-10-CM

## 2023-03-28 DIAGNOSIS — D72.820 LYMPHOCYTOSIS: ICD-10-CM

## 2023-03-28 LAB
BASOPHILS # BLD AUTO: 0.07 10*3/MM3 (ref 0–0.2)
BASOPHILS NFR BLD AUTO: 0.7 % (ref 0–1.5)
DEPRECATED RDW RBC AUTO: 41 FL (ref 37–54)
EOSINOPHIL # BLD AUTO: 0.16 10*3/MM3 (ref 0–0.4)
EOSINOPHIL NFR BLD AUTO: 1.7 % (ref 0.3–6.2)
ERYTHROCYTE [DISTWIDTH] IN BLOOD BY AUTOMATED COUNT: 11.8 % (ref 12.3–15.4)
HCT VFR BLD AUTO: 40.9 % (ref 34–46.6)
HGB BLD-MCNC: 14.1 G/DL (ref 12–15.9)
IMM GRANULOCYTES # BLD AUTO: 0.04 10*3/MM3 (ref 0–0.05)
IMM GRANULOCYTES NFR BLD AUTO: 0.4 % (ref 0–0.5)
LYMPHOCYTES # BLD AUTO: 3.38 10*3/MM3 (ref 0.7–3.1)
LYMPHOCYTES NFR BLD AUTO: 36 % (ref 19.6–45.3)
MCH RBC QN AUTO: 32.3 PG (ref 26.6–33)
MCHC RBC AUTO-ENTMCNC: 34.5 G/DL (ref 31.5–35.7)
MCV RBC AUTO: 93.8 FL (ref 79–97)
MONOCYTES # BLD AUTO: 0.83 10*3/MM3 (ref 0.1–0.9)
MONOCYTES NFR BLD AUTO: 8.8 % (ref 5–12)
NEUTROPHILS NFR BLD AUTO: 4.92 10*3/MM3 (ref 1.7–7)
NEUTROPHILS NFR BLD AUTO: 52.4 % (ref 42.7–76)
NRBC BLD AUTO-RTO: 0 /100 WBC (ref 0–0.2)
PLATELET # BLD AUTO: 218 10*3/MM3 (ref 140–450)
PMV BLD AUTO: 12 FL (ref 6–12)
RBC # BLD AUTO: 4.36 10*6/MM3 (ref 3.77–5.28)
WBC NRBC COR # BLD: 9.4 10*3/MM3 (ref 3.4–10.8)

## 2023-03-28 PROCEDURE — 36415 COLL VENOUS BLD VENIPUNCTURE: CPT

## 2023-03-28 PROCEDURE — 99213 OFFICE O/P EST LOW 20 MIN: CPT | Performed by: INTERNAL MEDICINE

## 2023-03-28 PROCEDURE — 85025 COMPLETE CBC W/AUTO DIFF WBC: CPT | Performed by: INTERNAL MEDICINE

## 2023-03-28 NOTE — PROGRESS NOTES
.     REASON FOR FOLLOWUP : Leukocytosis and lymphocytosis    HISTORY OF PRESENT ILLNESS:  The patient is a 57 y.o. year old female  who is here for follow-up with the above-mentioned history.     No new issues.  No fever, chills, weight loss.  States she has night sweats almost every night since around  or so.  She thinks this is due to menopause.  Her hair gets wet but she does not change her clothes in the night    Past Medical History:   Diagnosis Date   • Allergic    • Arthritis    • GERD (gastroesophageal reflux disease)    • H/O Anal skin tag    • History of colon polyp    • History of snoring    • Hypertension    • LBBB (left bundle branch block)    • Migraine    • PVC's (premature ventricular contractions)    • Raynaud's syndrome    • Seasonal allergies    • Sensory neuropathy, positional, left upper extremity 2018     Past Surgical History:   Procedure Laterality Date   •  SECTION      failure to progress   • COLONOSCOPY      One polyp removed       MEDICATIONS    Current Outpatient Medications:   •  Calcium Carbonate-Vitamin D3 600-400 MG-UNIT tablet, Take 2 tablets by mouth Daily., Disp: , Rfl:   •  CombiPatch 0.05-0.25 MG/DAY, , Disp: , Rfl:   •  lansoprazole (PREVACID) 15 MG capsule, Take 1 capsule by mouth Before Breakfast., Disp: , Rfl:   •  metoprolol succinate XL (TOPROL-XL) 25 MG 24 hr tablet, Take 0.5 tablets by mouth Daily., Disp: 45 tablet, Rfl: 3  •  Multiple Vitamins-Minerals (MULTIVITAMIN ADULT PO), Take  by mouth., Disp: , Rfl:   •  rosuvastatin (CRESTOR) 20 MG tablet, Take 1 tablet by mouth Every Night., Disp: 90 tablet, Rfl: 3  •  telmisartan-hydrochlorothiazide (MICARDIS HCT) 80-25 MG per tablet, Take 1 tablet by mouth Daily., Disp: 90 tablet, Rfl: 3    ALLERGIES:   No Known Allergies    SOCIAL HISTORY:       Social History     Socioeconomic History   • Marital status:      Spouse name: Khang   • Number of children: 1   Tobacco Use   • Smoking status:  "Never   • Smokeless tobacco: Never   Vaping Use   • Vaping Use: Never used   Substance and Sexual Activity   • Alcohol use: Yes     Alcohol/week: 2.0 standard drinks     Types: 2 Glasses of wine per week     Comment: occasionally.   • Drug use: No   • Sexual activity: Yes     Partners: Male     Birth control/protection: OCP         FAMILY HISTORY:  Family History   Problem Relation Age of Onset   • Diabetes Mother    • Diabetes type II Mother    • Hypertension Father    • Hyperlipidemia Father        REVIEW OF SYSTEMS:  Review of Systems   Constitutional: Negative for activity change.   HENT: Negative for nosebleeds and trouble swallowing.    Respiratory: Negative for shortness of breath and wheezing.    Cardiovascular: Negative for chest pain and palpitations.   Gastrointestinal: Negative for constipation, diarrhea and nausea.   Genitourinary: Negative for dysuria and hematuria.   Musculoskeletal: Negative for arthralgias and myalgias.   Skin: Negative for rash and wound.   Neurological: Negative for seizures and syncope.   Hematological: Negative for adenopathy. Does not bruise/bleed easily.   Psychiatric/Behavioral: Negative for confusion.              Vitals:    03/28/23 1101   BP: 141/97   Pulse: 79   Resp: 16   Temp: 97.8 °F (36.6 °C)   TempSrc: Temporal   SpO2: 100%   Weight: 67.9 kg (149 lb 12.8 oz)   Height: 152 cm (59.84\")   PainSc: 0-No pain     Current Status 3/28/2023   ECOG score 0      PHYSICAL EXAM:        CONSTITUTIONAL:  Vital signs reviewed.  No distress, looks comfortable.  EYES:  Conjunctiva and lids unremarkable.  PERRLA  EARS,NOSE,MOUTH,THROAT:  Ears and nose appear unremarkable.  Lips, teeth, gums appear unremarkable.  RESPIRATORY:  Normal respiratory effort.  Lungs clear to auscultation bilaterally.  CARDIOVASCULAR:  Normal S1, S2.  No murmurs rubs or gallops.  No significant lower extremity edema.  GASTROINTESTINAL: Abdomen appears unremarkable.  Nontender.  No hepatomegaly.  No " splenomegaly.  LYMPHATIC:  No cervical, supraclavicular, axillary lymphadenopathy.  SKIN:  Warm.  No rashes.  PSYCHIATRIC:  Normal judgment and insight.  Normal mood and affect.          RECENT LABS:        WBC   Date Value Ref Range Status   03/28/2023 9.40 3.40 - 10.80 10*3/mm3 Final   11/21/2022 8.17 3.40 - 10.80 10*3/mm3 Final   09/27/2022 12.03 (H) 3.40 - 10.80 10*3/mm3 Final   09/21/2022 10.65 3.40 - 10.80 10*3/mm3 Final   08/12/2022 11.5 (H) 3.4 - 10.8 x10E3/uL Final   02/24/2022 10.0 3.4 - 10.8 x10E3/uL Final   02/16/2022 11.53 (H) 3.40 - 10.80 10*3/mm3 Final   06/21/2021 7.5 3.4 - 10.8 x10E3/uL Final   03/12/2021 6.54 3.40 - 10.80 10*3/mm3 Final   03/11/2020 6.4 3.4 - 10.8 x10E3/uL Final   03/28/2019 8.20 3.40 - 10.80 10*3/mm3 Final   04/06/2018 9.62 4.50 - 10.70 10*3/mm3 Final     Hemoglobin   Date Value Ref Range Status   03/28/2023 14.1 12.0 - 15.9 g/dL Final   11/21/2022 13.2 12.0 - 15.9 g/dL Final   09/27/2022 14.0 12.0 - 15.9 g/dL Final   09/21/2022 14.0 12.0 - 15.9 g/dL Final   08/12/2022 13.9 11.1 - 15.9 g/dL Final   02/24/2022 12.5 11.1 - 15.9 g/dL Final   02/16/2022 14.5 12.0 - 15.9 g/dL Final   06/21/2021 11.9 11.1 - 15.9 g/dL Final   03/12/2021 14.8 12.0 - 15.9 g/dL Final   03/11/2020 12.9 11.1 - 15.9 g/dL Final   03/28/2019 13.0 12.0 - 15.9 g/dL Final   04/06/2018 13.6 11.9 - 15.5 g/dL Final     Platelets   Date Value Ref Range Status   03/28/2023 218 140 - 450 10*3/mm3 Final   11/21/2022 244 140 - 450 10*3/mm3 Final   09/27/2022 252 140 - 450 10*3/mm3 Final   09/21/2022 232 140 - 450 10*3/mm3 Final   08/12/2022 307 150 - 450 x10E3/uL Final   02/24/2022 251 150 - 450 x10E3/uL Final   02/16/2022 329 140 - 450 10*3/mm3 Final   06/21/2021 219 150 - 450 x10E3/uL Final   03/12/2021 291 140 - 450 10*3/mm3 Final   03/11/2020 273 150 - 450 x10E3/uL Final   03/28/2019 269 140 - 450 10*3/mm3 Final   04/06/2018 286 140 - 500 10*3/mm3 Final       Assessment & Plan   There are no diagnoses linked to this  encounter.      Radha RIVERA Higgins   *Lymphocytosis  · Although lymphocyte percent is normal, ALC intermittently elevated and when not elevated, usually upper limits of normal  ALC 3380, from 3970    *Leukocytosis, intermittent  · WBC was mostly 6.5-9.5 April 2018-June 2021.  · WBC 10-11.5 since February 2022   WBC 9.4, from 12    *Etiology of the above  · Hb and PLT unremarkable.  I told patient and  this could be a very early CLL or lymphoma involving the blood.  I told her if this is the case, I would not expect it to need any treatment.  · Peripheral blood flow 9/21/2022: Negative    *Night sweats almost every night since around 2020 or so (hair gets wet, but does not change her clothes).    · She attributes this to menopause  · Unchanged    Plan  · MD CBC 6 months  · She would like to maintain follow-up with me every 6 to 12 months long-term

## 2023-05-04 ENCOUNTER — TELEMEDICINE (OUTPATIENT)
Dept: FAMILY MEDICINE CLINIC | Facility: CLINIC | Age: 58
End: 2023-05-04
Payer: COMMERCIAL

## 2023-05-04 VITALS — BODY MASS INDEX: 27.75 KG/M2 | HEIGHT: 61 IN | WEIGHT: 147 LBS

## 2023-05-04 DIAGNOSIS — S30.861A TICK BITE OF ABDOMINAL WALL, INITIAL ENCOUNTER: Primary | ICD-10-CM
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
## 2023-05-04 RX ORDER — DOXYCYCLINE HYCLATE 100 MG
100 TABLET ORAL 2 TIMES DAILY
Qty: 20 TABLET | Refills: 0 | Status: SHIPPED | OUTPATIENT
Start: 2023-05-04 | End: 2023-05-14

## 2023-05-04 NOTE — ASSESSMENT & PLAN NOTE
Suspect early Lyme rash after tick bite.  Treat with doxycycline 100 mg twice daily.  Patient to stop her over-the-counter vitamin therapy.  She also knows to avoid dairy products within 2 hours of each tablet dose.  She is traveling and I advised her to wear sunscreen with SPF greater than 30 and using sun protection with clothing while out in the sun.  She will follow-up in the office as needed or in 2 weeks.  Labs have been created to survey for Childs spotted fever, Lyme disease and Ehrlicosis

## 2023-05-04 NOTE — PROGRESS NOTES
"Mode of Visit: Video  Location of patient: home  Location of provider: [facility]  You have chosen to receive care through a telehealth visit.  The patient has signed the video visit consent form.  The visit included audio and video interaction. No technical issues occurred during this visit     Subjective       Chief Complaint   Patient presents with    Tick Removal         HPI:        DENITA is a 57 y.o. female who presents to  [address] [facility] today for tick bite.  She was at her relatives house over the weekend on Saturday and noticed tick embedded on left abdomen on Monday.  The tick was embedded for greater than 24 hours.  She also noticed some redness and may be a welt when she removed it.  No fever sweats or chills or headaches reported currently.  No treatment so far.              PE:   Objective   Vitals:    05/04/23 1516   Weight: 66.7 kg (147 lb)   Height: 153.7 cm (60.5\")        Body mass index is 28.24 kg/m².    Physical Exam   Constitutional: She appears well-developed. No distress.   Eyes: Conjunctivae are normal. No scleral icterus.   Neck: Neck normal appearance.  Pulmonary/Chest: Effort normal.  She no audible wheeze...  Neurological: She is alert.   Skin: Skin is dry.        Psychiatric: She has a normal mood and affect.                           A/P:     Assessment & Plan   Diagnoses and all orders for this visit:    1. Tick bite of abdominal wall, initial encounter (Primary)  Assessment & Plan:  Suspect early Lyme rash after tick bite.  Treat with doxycycline 100 mg twice daily.  Patient to stop her over-the-counter vitamin therapy.  She also knows to avoid dairy products within 2 hours of each tablet dose.  She is traveling and I advised her to wear sunscreen with SPF greater than 30 and using sun protection with clothing while out in the sun.  She will follow-up in the office as needed or in 2 weeks.  Labs have been created to survey for " [place] spotted fever, Lyme disease and Ehrlicosis    Orders:  -     doxycycline (VIBRAMYICN) 100 MG tablet; Take 1 tablet by mouth 2 (Two) Times a Day for 10 days. No dairy products within 2 hours of a dose  Dispense: 20 tablet; Refill: 0  -     Rickettsia Species DNA, Real-Time PCR; Future  -     Lyme Disease Total Antibody With Reflex to Immunoassay; Future  -     Ehrlichia Profile DNA PCR; Future          Follow up:   Return in about 2 weeks (around 5/18/2023), or if symptoms worsen or fail to improve, for recheck of current condition.  Patient was given instructions and counseling regarding her condition or for health maintenance advice. Please see specific information pulled into the AVS if appropriate.

## 2023-05-12 ENCOUNTER — OFFICE VISIT (OUTPATIENT)
Dept: CARDIOLOGY | Facility: CLINIC | Age: 58
End: 2023-05-12
Payer: COMMERCIAL

## 2023-05-12 VITALS
SYSTOLIC BLOOD PRESSURE: 126 MMHG | HEIGHT: 61 IN | WEIGHT: 147 LBS | DIASTOLIC BLOOD PRESSURE: 80 MMHG | BODY MASS INDEX: 27.75 KG/M2 | HEART RATE: 74 BPM

## 2023-05-12 DIAGNOSIS — I49.3 PVC'S (PREMATURE VENTRICULAR CONTRACTIONS): Primary | ICD-10-CM

## 2023-05-12 DIAGNOSIS — E78.00 PURE HYPERCHOLESTEROLEMIA: ICD-10-CM

## 2023-05-12 DIAGNOSIS — I44.7 LBBB (LEFT BUNDLE BRANCH BLOCK): ICD-10-CM

## 2023-05-12 DIAGNOSIS — I10 ESSENTIAL HYPERTENSION: ICD-10-CM

## 2023-05-12 PROCEDURE — 93000 ELECTROCARDIOGRAM COMPLETE: CPT | Performed by: INTERNAL MEDICINE

## 2023-05-12 PROCEDURE — 99214 OFFICE O/P EST MOD 30 MIN: CPT | Performed by: INTERNAL MEDICINE

## 2023-05-12 RX ORDER — METOPROLOL SUCCINATE 25 MG/1
25 TABLET, EXTENDED RELEASE ORAL DAILY
Qty: 90 TABLET | Refills: 3 | Status: SHIPPED | OUTPATIENT
Start: 2023-05-12 | End: 2024-05-11

## 2023-05-12 NOTE — PROGRESS NOTES
Date of Office Visit: 2023  Encounter Provider: Claire Kaur MD  Place of Service: [facility] CARDIOLOGY  Patient Name: Radha Higgins  :1965      Patient ID:  Radha Higgins is a 57 y.o. female is here for  followup for PVCs.        History of Present Illness    She has a history of hypertension, PVCs, intraventricular conduction delay, hyperlipidemia, Raynaud's disease.  She had previously seen Dr. Conklin.     She had a 13-day monitor done 2022 showing sinus rhythm, 3 episodes of nonsustained SVT lasting 8 beats and frequent PVCs of 5.6%-palpitations correlated with PVCs.  She was treated with metoprolol succinate 12.5 mg daily.  Echocardiogram done 2022 showed ejection fraction 63% with grade 2 diastolic dysfunction, negative saline contrast study no significant valvular abnormalities.    He had COVID-19 and her PVCs did get worse.  She notices that some days are worse with PVCs and others.  She is had no dizziness or syncope.  When she lies at night specially she lies on her left side, she feels PVCs.  Her energy level has been stable.  She is trying to exercise.  She has no orthopnea or PND.  She has no exertional dyspnea.  She has been taking her medications as directed without difficulty.  Overall, she feels pretty well except when she has the PVCs.      Past Medical History:   Diagnosis Date   • Abnormal    • Allergic    • Arthritis    • GERD (gastroesophageal reflux disease)    • H/O Anal skin tag    • History of colon polyp    • History of snoring    • Hyperlipidemia    • Hypertension    • LBBB (left bundle branch block)    • Migraine    • PVC's (premature ventricular contractions)    • Raynaud's syndrome    • Seasonal allergies    • Sensory neuropathy, positional, left upper extremity 2018         Past Surgical History:   Procedure Laterality Date   •  SECTION      failure to progress   • COLONOSCOPY  2019    One  polyp removed       Current Outpatient Medications on File Prior to Visit   Medication Sig Dispense Refill   • Calcium Carbonate-Vitamin D3 600-400 MG-UNIT tablet Take 2 tablets by mouth Daily.     • CombiPatch 0.05-0.25 MG/DAY      • doxycycline (VIBRAMYICN) 100 MG tablet Take 1 tablet by mouth 2 (Two) Times a Day for 10 days. No dairy products within 2 hours of a dose 20 tablet 0   • lansoprazole (PREVACID) 15 MG capsule Take 1 capsule by mouth Before Breakfast.     • Multiple Vitamins-Minerals (MULTIVITAMIN ADULT PO) Take  by mouth.     • rosuvastatin (CRESTOR) 20 MG tablet Take 1 tablet by mouth Every Night. 90 tablet 3   • telmisartan-hydrochlorothiazide (MICARDIS HCT) 80-25 MG per tablet Take 1 tablet by mouth Daily. 90 tablet 3   • [DISCONTINUED] metoprolol succinate XL (TOPROL-XL) 25 MG 24 hr tablet Take 0.5 tablets by mouth Daily. 45 tablet 3     No current facility-administered medications on file prior to visit.       Social History     Socioeconomic History   • Marital status:      Spouse name: Khang   • Number of children: 1   Tobacco Use   • Smoking status: Never     Passive exposure: Never   • Smokeless tobacco: Never   Vaping Use   • Vaping Use: Never used   Substance and Sexual Activity   • Alcohol use: Yes     Alcohol/week: 3.0 standard drinks     Types: 3 Glasses of wine per week     Comment: occasionally.   • Drug use: Never   • Sexual activity: Yes     Partners: Male     Birth control/protection: Post-menopausal           ROS    Procedures    ECG 12 Lead    Date/Time: 5/12/2023 3:09 PM  Performed by: Claire Kaur MD  Authorized by: Claire Kaur MD   Comparison: compared with previous ECG   Similar to previous ECG  Rhythm: sinus rhythm  Ectopy: unifocal PVCs  Conduction: non-specific intraventricular conduction delay    Clinical impression: abnormal EKG                Objective:      Vitals:    05/12/23 1438   BP: 126/80   Pulse: 74   Weight: 66.7 kg (147 lb)   Height:  "153.7 cm (60.5\")     Body mass index is 28.24 kg/m².    Vitals reviewed.   Constitutional:       General: Not in acute distress.     Appearance: Well-developed. Not diaphoretic.   Eyes:      General: No scleral icterus.     Conjunctiva/sclera: Conjunctivae normal.   HENT:      Head: Normocephalic and atraumatic.   Neck:      Thyroid: No thyromegaly.      Vascular: No carotid bruit or JVD.      Lymphadenopathy: No cervical adenopathy.   Pulmonary:      Effort: Pulmonary effort is normal. No respiratory distress.      Breath sounds: Normal breath sounds. No wheezing. No rhonchi. No rales.   Chest:      Chest wall: Not tender to palpatation.   Cardiovascular:      Normal rate. Regular rhythm.      No gallop.   Pulses:     Intact distal pulses.   Edema:     Peripheral edema absent.   Abdominal:      General: Bowel sounds are normal. There is no distension or abdominal bruit.      Palpations: Abdomen is soft. There is no abdominal mass.      Tenderness: There is no abdominal tenderness.   Musculoskeletal:         General: No deformity.      Extremities: No clubbing present.     Cervical back: Neck supple. Skin:     General: Skin is warm and dry. There is no cyanosis.      Coloration: Skin is not pale.      Findings: No rash.   Neurological:      Mental Status: Alert and oriented to person, place, and time.      Cranial Nerves: No cranial nerve deficit.   Psychiatric:         Judgment: Judgment normal.         Lab Review:       Assessment:      Diagnosis Plan   1. PVC's (premature ventricular contractions)        2. Pure hypercholesterolemia        3. LBBB (left bundle branch block)        4. Essential hypertension  metoprolol succinate XL (TOPROL-XL) 25 MG 24 hr tablet        1. PVCs: She has palpitations with these.  She is having more PVCs, increase metoprolol succinate to 25 mg daily.  2. PSVT: Rare  3. Left bundle branch block.  Echocardiogram in March 2022 showed EF 62.5%, grade 2 LV diastolic dysfunction with high " LAP, and trace mitral and tricuspid insufficiency.  She denies chest pain.  Her ECG is stable.  4. Hypertension controlled on telmisartan-HCTZ and metoprolol succinate.  5. Hyperlipidemia: She is treated with 20 mg Crestor daily.       Plan:       See Razia in 1 year, increase metoprolol due to more PVCs, overall though doing better, advised exercise.

## 2023-06-07 ENCOUNTER — OFFICE VISIT (OUTPATIENT)
Dept: FAMILY MEDICINE CLINIC | Facility: CLINIC | Age: 58
End: 2023-06-07
Payer: COMMERCIAL

## 2023-06-07 VITALS
DIASTOLIC BLOOD PRESSURE: 68 MMHG | SYSTOLIC BLOOD PRESSURE: 110 MMHG | RESPIRATION RATE: 18 BRPM | HEIGHT: 61 IN | HEART RATE: 62 BPM | BODY MASS INDEX: 28.13 KG/M2 | OXYGEN SATURATION: 100 % | TEMPERATURE: 98.4 F | WEIGHT: 149 LBS

## 2023-06-07 DIAGNOSIS — M77.12 LATERAL EPICONDYLITIS OF LEFT ELBOW: Primary | ICD-10-CM

## 2023-06-07 PROCEDURE — 99213 OFFICE O/P EST LOW 20 MIN: CPT | Performed by: FAMILY MEDICINE

## 2023-06-07 RX ORDER — MELOXICAM 15 MG/1
15 TABLET ORAL DAILY
Qty: 30 TABLET | Refills: 0 | Status: SHIPPED | OUTPATIENT
Start: 2023-06-07 | End: 2023-07-07

## 2023-06-07 NOTE — PROGRESS NOTES
"Chief Complaint  Elbow Pain (Lt elbow pain with raising arm x 2 months, worsening joint pain )    Subjective        HPI   DENITA presents to [facility] for left elbow pain for the past 90 days.  Patient only in the past couple days to start to get slightly better.  No known injury.  Pain with certain grasping motions and lifting motions reported.  She also reports some general overall achiness upon getting up from a sitting position          Objective   Vital Signs:   Vitals:    06/07/23 0818   BP: 110/68   Pulse: 62   Resp: 18   Temp: 98.4 °F (36.9 °C)   SpO2: 100%   Weight: 67.6 kg (149 lb)   Height: 153.7 cm (60.5\")   PainSc:   7        [unfilled]      Physical Exam  Vitals reviewed.   Constitutional:       General: She is not in acute distress.  Musculoskeletal:      Comments: Tenderness around lateral epicondyles left elbow.  Normal elbow range of motion.  Pain with resistance to wrist extension.  No obvious swelling.        Result Review :                Assessment and Plan    Diagnoses and all orders for this visit:    1. Lateral epicondylitis of left elbow (Primary)  Assessment & Plan:  New problem.  Treat with meloxicam 15 mg daily with meal as needed.  Will use elbow strap during daytime.  Avoid lifting with left arm.  Contact office if symptoms not resolved by 6 to 8 weeks.  Follow-up as needed    Orders:  -     meloxicam (MOBIC) 15 MG tablet; Take 1 tablet by mouth Daily for 30 days.  Dispense: 30 tablet; Refill: 0        Follow Up   Return if symptoms worsen or fail to improve.  Patient was given instructions and counseling regarding her condition or for health maintenance advice. Please see specific information pulled into the AVS if appropriate.   "

## 2023-06-07 NOTE — ASSESSMENT & PLAN NOTE
New problem.  Treat with meloxicam 15 mg daily with meal as needed.  Will use elbow strap during daytime.  Avoid lifting with left arm.  Contact office if symptoms not resolved by 6 to 8 weeks.  Follow-up as needed

## 2023-06-19 DIAGNOSIS — I10 ESSENTIAL HYPERTENSION: ICD-10-CM

## 2023-06-19 RX ORDER — METOPROLOL SUCCINATE 25 MG/1
25 TABLET, EXTENDED RELEASE ORAL DAILY
Qty: 90 TABLET | Refills: 3 | Status: SHIPPED | OUTPATIENT
Start: 2023-06-19 | End: 2024-06-18

## 2023-09-12 ENCOUNTER — LAB (OUTPATIENT)
Dept: OTHER | Facility: HOSPITAL | Age: 58
End: 2023-09-12
Payer: COMMERCIAL

## 2023-09-12 ENCOUNTER — OFFICE VISIT (OUTPATIENT)
Dept: ONCOLOGY | Facility: CLINIC | Age: 58
End: 2023-09-12
Payer: COMMERCIAL

## 2023-09-12 VITALS
HEIGHT: 60 IN | OXYGEN SATURATION: 100 % | DIASTOLIC BLOOD PRESSURE: 84 MMHG | RESPIRATION RATE: 16 BRPM | WEIGHT: 150.2 LBS | SYSTOLIC BLOOD PRESSURE: 123 MMHG | TEMPERATURE: 98.2 F | HEART RATE: 75 BPM | BODY MASS INDEX: 29.49 KG/M2

## 2023-09-12 DIAGNOSIS — D72.820 LYMPHOCYTOSIS: ICD-10-CM

## 2023-09-12 DIAGNOSIS — D72.820 LYMPHOCYTOSIS: Primary | ICD-10-CM

## 2023-09-12 LAB
BASOPHILS # BLD AUTO: 0.06 10*3/MM3 (ref 0–0.2)
BASOPHILS NFR BLD AUTO: 0.7 % (ref 0–1.5)
DEPRECATED RDW RBC AUTO: 42.3 FL (ref 37–54)
EOSINOPHIL # BLD AUTO: 0.14 10*3/MM3 (ref 0–0.4)
EOSINOPHIL NFR BLD AUTO: 1.7 % (ref 0.3–6.2)
ERYTHROCYTE [DISTWIDTH] IN BLOOD BY AUTOMATED COUNT: 12.2 % (ref 12.3–15.4)
HCT VFR BLD AUTO: 38.9 % (ref 34–46.6)
HGB BLD-MCNC: 13.1 G/DL (ref 12–15.9)
IMM GRANULOCYTES # BLD AUTO: 0.02 10*3/MM3 (ref 0–0.05)
IMM GRANULOCYTES NFR BLD AUTO: 0.2 % (ref 0–0.5)
LYMPHOCYTES # BLD AUTO: 2.82 10*3/MM3 (ref 0.7–3.1)
LYMPHOCYTES NFR BLD AUTO: 33.3 % (ref 19.6–45.3)
MCH RBC QN AUTO: 31.7 PG (ref 26.6–33)
MCHC RBC AUTO-ENTMCNC: 33.7 G/DL (ref 31.5–35.7)
MCV RBC AUTO: 94.2 FL (ref 79–97)
MONOCYTES # BLD AUTO: 0.73 10*3/MM3 (ref 0.1–0.9)
MONOCYTES NFR BLD AUTO: 8.6 % (ref 5–12)
NEUTROPHILS NFR BLD AUTO: 4.7 10*3/MM3 (ref 1.7–7)
NEUTROPHILS NFR BLD AUTO: 55.5 % (ref 42.7–76)
NRBC BLD AUTO-RTO: 0 /100 WBC (ref 0–0.2)
PLATELET # BLD AUTO: 181 10*3/MM3 (ref 140–450)
PMV BLD AUTO: 12.4 FL (ref 6–12)
RBC # BLD AUTO: 4.13 10*6/MM3 (ref 3.77–5.28)
WBC NRBC COR # BLD: 8.47 10*3/MM3 (ref 3.4–10.8)

## 2023-09-12 PROCEDURE — 85025 COMPLETE CBC W/AUTO DIFF WBC: CPT | Performed by: INTERNAL MEDICINE

## 2023-09-12 PROCEDURE — 99213 OFFICE O/P EST LOW 20 MIN: CPT | Performed by: INTERNAL MEDICINE

## 2023-09-12 PROCEDURE — 36415 COLL VENOUS BLD VENIPUNCTURE: CPT

## 2023-09-12 NOTE — PROGRESS NOTES
.     REASON FOR FOLLOWUP : Leukocytosis and lymphocytosis    HISTORY OF PRESENT ILLNESS:  The patient is a 58 y.o. year old female  who is here for follow-up with the above-mentioned history.     No new problems.  No significant infections since last visit.  No fever, chills, weight loss, or change in night sweats    Past Medical History:   Diagnosis Date    Abnormal     Allergic     Arthritis     GERD (gastroesophageal reflux disease)     H/O Anal skin tag     History of colon polyp     History of snoring     Hyperlipidemia     Hypertension     LBBB (left bundle branch block)     Migraine     PVC's (premature ventricular contractions)     Raynaud's syndrome     Seasonal allergies     Sensory neuropathy, positional, left upper extremity 2018     Past Surgical History:   Procedure Laterality Date     SECTION      failure to progress    COLONOSCOPY      One polyp removed       MEDICATIONS    Current Outpatient Medications:     Calcium Carbonate-Vitamin D3 600-400 MG-UNIT tablet, Take 2 tablets by mouth Daily., Disp: , Rfl:     CombiPatch 0.05-0.25 MG/DAY, , Disp: , Rfl:     lansoprazole (PREVACID) 15 MG capsule, Take 1 capsule by mouth Before Breakfast., Disp: , Rfl:     metoprolol succinate XL (TOPROL-XL) 25 MG 24 hr tablet, Take 1 tablet by mouth Daily., Disp: 90 tablet, Rfl: 3    Multiple Vitamins-Minerals (MULTIVITAMIN ADULT PO), Take  by mouth., Disp: , Rfl:     rosuvastatin (CRESTOR) 20 MG tablet, Take 1 tablet by mouth Every Night., Disp: 90 tablet, Rfl: 3    telmisartan-hydrochlorothiazide (MICARDIS HCT) 80-25 MG per tablet, Take 1 tablet by mouth Daily., Disp: 90 tablet, Rfl: 3    ALLERGIES:   No Known Allergies    SOCIAL HISTORY:       Social History     Socioeconomic History    Marital status:      Spouse name: Khang    Number of children: 1   Tobacco Use    Smoking status: Never     Passive exposure: Never    Smokeless tobacco: Never   Vaping Use    Vaping Use:  "Never used   Substance and Sexual Activity    Alcohol use: Yes     Alcohol/week: 3.0 standard drinks     Types: 3 Glasses of wine per week     Comment: occasionally.    Drug use: Never    Sexual activity: Yes     Partners: Male     Birth control/protection: Post-menopausal         FAMILY HISTORY:  Family History   Problem Relation Age of Onset    Diabetes Mother     Diabetes type II Mother     Hypertension Father     Hyperlipidemia Father        REVIEW OF SYSTEMS:  Review of Systems   Constitutional:  Negative for activity change.   HENT:  Negative for nosebleeds and trouble swallowing.    Respiratory:  Negative for shortness of breath and wheezing.    Cardiovascular:  Negative for chest pain and palpitations.   Gastrointestinal:  Negative for constipation, diarrhea and nausea.   Genitourinary:  Negative for dysuria and hematuria.   Musculoskeletal:  Negative for arthralgias and myalgias.   Skin:  Negative for rash and wound.   Neurological:  Negative for seizures and syncope.   Hematological:  Negative for adenopathy. Does not bruise/bleed easily.   Psychiatric/Behavioral:  Negative for confusion.             Vitals:    09/12/23 0907   BP: 123/84   Pulse: 75   Resp: 16   Temp: 98.2 °F (36.8 °C)   TempSrc: Temporal   SpO2: 100%   Weight: 68.1 kg (150 lb 3.2 oz)   Height: 153 cm (60.24\")   PainSc: 0-No pain         9/12/2023     9:08 AM   Current Status   ECOG score 0      PHYSICAL EXAM:        CONSTITUTIONAL:  Vital signs reviewed.  No distress, looks comfortable.  EYES:  Conjunctiva and lids unremarkable.  PERRLA  EARS,NOSE,MOUTH,THROAT:  Ears and nose appear unremarkable.  Lips, teeth, gums appear unremarkable.  RESPIRATORY:  Normal respiratory effort.  Lungs clear to auscultation bilaterally.  CARDIOVASCULAR:  Normal S1, S2.  No murmurs rubs or gallops.  No significant lower extremity edema.  GASTROINTESTINAL: Abdomen appears unremarkable.  Nontender.  No hepatomegaly.  No splenomegaly.  LYMPHATIC:  No cervical, " supraclavicular, axillary lymphadenopathy.  SKIN:  Warm.  No rashes.  PSYCHIATRIC:  Normal judgment and insight.  Normal mood and affect.       RECENT LABS:        WBC   Date Value Ref Range Status   09/12/2023 8.47 3.40 - 10.80 10*3/mm3 Final   03/28/2023 9.40 3.40 - 10.80 10*3/mm3 Final   11/21/2022 8.17 3.40 - 10.80 10*3/mm3 Final   09/27/2022 12.03 (H) 3.40 - 10.80 10*3/mm3 Final   09/21/2022 10.65 3.40 - 10.80 10*3/mm3 Final   08/12/2022 11.5 (H) 3.4 - 10.8 x10E3/uL Final   02/24/2022 10.0 3.4 - 10.8 x10E3/uL Final   02/16/2022 11.53 (H) 3.40 - 10.80 10*3/mm3 Final   06/21/2021 7.5 3.4 - 10.8 x10E3/uL Final   03/12/2021 6.54 3.40 - 10.80 10*3/mm3 Final   03/11/2020 6.4 3.4 - 10.8 x10E3/uL Final   03/28/2019 8.20 3.40 - 10.80 10*3/mm3 Final   04/06/2018 9.62 4.50 - 10.70 10*3/mm3 Final     Hemoglobin   Date Value Ref Range Status   09/12/2023 13.1 12.0 - 15.9 g/dL Final   03/28/2023 14.1 12.0 - 15.9 g/dL Final   11/21/2022 13.2 12.0 - 15.9 g/dL Final   09/27/2022 14.0 12.0 - 15.9 g/dL Final   09/21/2022 14.0 12.0 - 15.9 g/dL Final   08/12/2022 13.9 11.1 - 15.9 g/dL Final   02/24/2022 12.5 11.1 - 15.9 g/dL Final   02/16/2022 14.5 12.0 - 15.9 g/dL Final   06/21/2021 11.9 11.1 - 15.9 g/dL Final   03/12/2021 14.8 12.0 - 15.9 g/dL Final   03/11/2020 12.9 11.1 - 15.9 g/dL Final   03/28/2019 13.0 12.0 - 15.9 g/dL Final   04/06/2018 13.6 11.9 - 15.5 g/dL Final     Platelets   Date Value Ref Range Status   09/12/2023 181 140 - 450 10*3/mm3 Final   03/28/2023 218 140 - 450 10*3/mm3 Final   11/21/2022 244 140 - 450 10*3/mm3 Final   09/27/2022 252 140 - 450 10*3/mm3 Final   09/21/2022 232 140 - 450 10*3/mm3 Final   08/12/2022 307 150 - 450 x10E3/uL Final   02/24/2022 251 150 - 450 x10E3/uL Final   02/16/2022 329 140 - 450 10*3/mm3 Final   06/21/2021 219 150 - 450 x10E3/uL Final   03/12/2021 291 140 - 450 10*3/mm3 Final   03/11/2020 273 150 - 450 x10E3/uL Final   03/28/2019 269 140 - 450 10*3/mm3 Final   04/06/2018 286 140 -  500 10*3/mm3 Final       Assessment & Plan   Lymphocytosis  - CBC & Differential        Radha RIVERA Higgins   *Lymphocytosis  Although lymphocyte percent is normal, ALC intermittently elevated and when not elevated, usually upper limits of normal  ALC 2820, from 3380, from 3970    *Leukocytosis, intermittent  WBC was mostly 6.5-9.5 April 2018-June 2021.  WBC 10-11.5 since February 2022   WBC 8.5, from 9.4, from 12    *Etiology of the above  Hb and PLT unremarkable.  I told patient and  this could be a very early CLL or lymphoma involving the blood.  I told her if this is the case, I would not expect it to need any treatment.  Peripheral blood flow 9/21/2022: Negative    *Night sweats almost every night since around 2020 or so (hair gets wet, but does not change her clothes).    She attributes this to menopause  These are unchanged    Plan  MD CBC 6 months  She would like to maintain follow-up with me every 6 to 12 months long-term

## 2023-11-29 ENCOUNTER — OFFICE VISIT (OUTPATIENT)
Dept: FAMILY MEDICINE CLINIC | Facility: CLINIC | Age: 58
End: 2023-11-29
Payer: COMMERCIAL

## 2023-11-29 VITALS
DIASTOLIC BLOOD PRESSURE: 85 MMHG | RESPIRATION RATE: 16 BRPM | TEMPERATURE: 97.8 F | BODY MASS INDEX: 28.32 KG/M2 | OXYGEN SATURATION: 100 % | SYSTOLIC BLOOD PRESSURE: 131 MMHG | WEIGHT: 150 LBS | HEART RATE: 86 BPM | HEIGHT: 61 IN

## 2023-11-29 DIAGNOSIS — Z23 IMMUNIZATION DUE: ICD-10-CM

## 2023-11-29 DIAGNOSIS — K21.9 GASTROESOPHAGEAL REFLUX DISEASE WITHOUT ESOPHAGITIS: ICD-10-CM

## 2023-11-29 DIAGNOSIS — E78.00 PURE HYPERCHOLESTEROLEMIA: ICD-10-CM

## 2023-11-29 DIAGNOSIS — Z00.00 ENCOUNTER FOR WELLNESS EXAMINATION IN ADULT: Primary | ICD-10-CM

## 2023-11-29 DIAGNOSIS — I10 ESSENTIAL HYPERTENSION: ICD-10-CM

## 2023-11-29 RX ORDER — MECOBALAMIN 5000 MCG
15 TABLET,DISINTEGRATING ORAL
COMMUNITY

## 2023-11-29 RX ORDER — ROSUVASTATIN CALCIUM 20 MG/1
20 TABLET, COATED ORAL NIGHTLY
Qty: 90 TABLET | Refills: 3 | Status: SHIPPED | OUTPATIENT
Start: 2023-11-29 | End: 2024-11-28

## 2023-11-29 RX ORDER — TELMISARTAN AND HYDROCHLORTHIAZIDE 80; 25 MG/1; MG/1
1 TABLET ORAL DAILY
Qty: 90 TABLET | Refills: 3 | Status: SHIPPED | OUTPATIENT
Start: 2023-11-29 | End: 2024-11-28

## 2023-11-29 RX ORDER — METOPROLOL SUCCINATE 25 MG/1
25 TABLET, EXTENDED RELEASE ORAL DAILY
Qty: 90 TABLET | Refills: 3 | Status: SHIPPED | OUTPATIENT
Start: 2023-11-29 | End: 2024-11-28

## 2023-11-29 NOTE — PATIENT INSTRUCTIONS
RSV vaccine is recommended to prevent Respiratory syncytial virus infection. It is recommended for adults over age 60. Please get this vaccination at your local pharmacy.  I have included some information about this infection for your consideration.     Respiratory Syncytial Virus Infection, Adult  Respiratory syncytial virus (RSV) infection is an infection caused by RSV, a common virus. This virus is similar to viruses that cause the common cold and the flu. RSV infection can affect the nose, throat, windpipe, and lungs (respiratory system). When the infection is severe, it can cause:  Bronchiolitis. This condition causes inflammation of the air passages in the lungs (bronchioles).  Pneumonia. This condition causes inflammation of the air sacs in the lungs.  RSV infection spreads from person to person (is contagious) through droplets from coughs and sneezes (respiratory secretions). This condition is rarely serious when it occurs in adults.  What are the causes?  This condition is caused by contact with RSV. This can happen by:  Breathing respiratory secretions from someone who has the infection.  Touching something that has been exposed to the virus (is contaminated) and then touching your mouth, nose, or eyes.  Coming in close contact with someone who has this infection. This may happen if you:  Hug or kiss.  Shake or hold hands.  Eat or drink using the same dishes or utensils.  What increases the risk?  The following factors may make you more likely to develop this condition:  Being 65 years of age or older.  Having certain health conditions, including:  A long-term (chronic) lung condition, such as chronic obstructive pulmonary disease (COPD).  An immune system that is weak. This is your body's defense system.  Down syndrome.  Heart disease.  Working in a hospital or other health care facility.  Living in a long-term health care facility.  RSV infections are most common from the months of November to April, but  they can happen any time of year.  What are the signs or symptoms?  Symptoms of this condition include:  Having a runny nose.  Coughing. You may have a cough that brings up mucus (productive cough).  Sneezing.  Having a fever.  Wanting to eat less than usual.  Breathing loudly (wheezing).  Having shortness of breath.  Having fluid build up in the lungs (respiratory distress).  How is this diagnosed?  This condition may be diagnosed based on:  Your symptoms.  Your medical history.  A physical exam.  A chest X-ray to rule out pneumonia.  Blood tests or tests of mucus from your lungs (sputum). These tests may be done for older adults.  A test of a sample of your respiratory secretions.  How is this treated?  In most cases, the RSV infection will go away after 1-2 weeks of caring for yourself at home.   Sometimes, RSV infection is severe and can cause bronchiolitis or pneumonia. If you develop one or both of these conditions, you may need to be treated in the hospital. You may be given:  Oxygen therapy.  Antiviral medicine.  Medicines to open your bronchioles (bronchodilators).  Follow these instructions at home:  Medicines  Take over-the-counter and prescription medicines only as told by your health care provider.  If you were prescribed an antiviral medicine, take it as told by your health care provider. Do not stop using the antiviral even if you start to feel better.  Lifestyle    Eat a healthy diet.  Do not drink alcohol.  Do not use any products that contain nicotine or tobacco, such as cigarettes, e-cigarettes, and chewing tobacco. If you need help quitting, ask your health care provider.  Rest at home until your symptoms go away.  Return to your normal activities as told by your health care provider. Ask your health care provider what activities are safe for you.  General instructions    Drink enough fluid to keep your urine pale yellow.  Gargle with a salt-water mixture 3-4 times a day or as needed. To make a  salt-water mixture, completely dissolve ½-1 tsp (3-6 g) of salt in 1 cup (237 mL) of warm water.  Keep all follow-up visits as told by your health care provider. This is important.  How is this prevented?  To prevent catching and spreading RSV:  Wash your hands often with soap and water for at least 20 seconds. If soap and water are not available, use hand . Do not touch your face without first cleaning your hands.  Stay home if you have symptoms of the common cold or the flu.  Cover your nose and mouth when you cough or sneeze.  Avoid large groups of people.  Keep a safe distance of about 6 feet (1.8 m) from people who are coughing or sneezing.  Where to find more information  Centers for Disease Control and Prevention: www.cdc.gov  Contact a health care provider if:  Your symptoms get worse or have not changed after 2 weeks.  You have:  A fever.  Hot flashes, sweating, or chills that keep happening.  A cough that brings up much more mucus than usual.  A cough that brings up blood.  You feel:  Very tired (lethargic).  Confused.  Get help right away if:  You have increased or severe trouble breathing.  You lose consciousness.  These symptoms may represent a serious problem that is an emergency. Do not wait to see if the symptoms will go away. Get medical help right away. Call your local emergency services (911 in the [place] Do not drive yourself to the hospital.  Summary  Respiratory syncytial virus (RSV) infection is an infection caused by RSV, a common virus. RSV infection can affect the nose, throat, windpipe, and lungs (respiratory system).  When the infection is severe, it can cause bronchiolitis or pneumonia.  Take over-the-counter and prescription medicines only as told by your health care provider.  Contact a health care provider if your symptoms get worse or have not changed after 2 weeks.  This information is not intended to replace advice given to you by your health care provider. Make sure you  discuss any questions you have with your health care provider.  Document Revised: 09/30/2020 Document Reviewed: 10/07/2020  [employer] Patient Education © 2022 [employer]     You are due for Covid booster. Please get this vaccine at your local pharmacy. COVID-19 vaccine may be administered without regards to timing of other vaccines.  If administering on the same day as another vaccine, administer in a different site (arm).  COVID-19 VACCINE reduces the risk of COVID-19. It does not treat COVID-19. It is still possible to get COVID-19 after receiving this vaccine, but the symptoms may be less severe or not last as long. It works by helping your immune system learn how to fight off a future infection.     What side effects may I notice from receiving this medication?  Side effects that you should report to your care team as soon as possible:  Allergic reactions--skin rash, itching, hives, swelling of the face, lips, tongue, or throat  Heart muscle inflammation--unusual weakness or fatigue, shortness of breath, chest pain, fast or irregular heartbeat, dizziness, swelling of the ankles, feet, or hands  Side effects that usually do not require medical attention (report these to your care team if they continue or are bothersome):  Chills  Fatigue  Fever  Headache  Joint pain  Muscle pain  Nausea  Pain, redness, or irritation at injection site  Swollen lymph nodes  Vomiting  This list may not describe all possible side effects. Call your doctor for medical advice about side effects. You may report side effects to FDA at 9-277-FDA-0498.     Calorie Counting for Weight Loss  Calories are units of energy. Your body needs a certain number of calories from food to keep going throughout the day. When you eat or drink more calories than your body needs, your body stores the extra calories mostly as fat. When you eat or drink fewer calories than your body needs, your body burns fat to get the energy it needs.  Calorie counting  means keeping track of how many calories you eat and drink each day. Calorie counting can be helpful if you need to lose weight. If you eat fewer calories than your body needs, you should lose weight. Ask your health care provider what a healthy weight is for you.  For calorie counting to work, you will need to eat the right number of calories each day to lose a healthy amount of weight per week. A dietitian can help you figure out how many calories you need in a day and will suggest ways to reach your calorie goal.  A healthy amount of weight to lose each week is usually 1-2 lb (0.5-0.9 kg). This usually means that your daily calorie intake should be reduced by 500-750 calories.  Eating 1,200-1,500 calories a day can help most women lose weight.  Eating 1,500-1,800 calories a day can help most men lose weight.  What do I need to know about calorie counting?  Work with your health care provider or dietitian to determine how many calories you should get each day. To meet your daily calorie goal, you will need to:  Find out how many calories are in each food that you would like to eat. Try to do this before you eat.  Decide how much of the food you plan to eat.  Keep a food log. Do this by writing down what you ate and how many calories it had.  To successfully lose weight, it is important to balance calorie counting with a healthy lifestyle that includes regular activity.  Where do I find calorie information?    The number of calories in a food can be found on a Nutrition Facts label. If a food does not have a Nutrition Facts label, try to look up the calories online or ask your dietitian for help.  Remember that calories are listed per serving. If you choose to have more than one serving of a food, you will have to multiply the calories per serving by the number of servings you plan to eat. For example, the label on a package of bread might say that a serving size is 1 slice and that there are 90 calories in a  serving. If you eat 1 slice, you will have eaten 90 calories. If you eat 2 slices, you will have eaten 180 calories.  How do I keep a food log?  After each time that you eat, record the following in your food log as soon as possible:  What you ate. Be sure to include toppings, sauces, and other extras on the food.  How much you ate. This can be measured in cups, ounces, or number of items.  How many calories were in each food and drink.  The total number of calories in the food you ate.  Keep your food log near you, such as in a pocket-sized notebook or on an radha or website on your mobile phone. Some programs will calculate calories for you and show you how many calories you have left to meet your daily goal.  What are some portion-control tips?  Know how many calories are in a serving. This will help you know how many servings you can have of a certain food.  Use a measuring cup to measure serving sizes. You could also try weighing out portions on a kitchen scale. With time, you will be able to estimate serving sizes for some foods.  Take time to put servings of different foods on your favorite plates or in your favorite bowls and cups so you know what a serving looks like.  Try not to eat straight from a food's packaging, such as from a bag or box. Eating straight from the package makes it hard to see how much you are eating and can lead to overeating. Put the amount you would like to eat in a cup or on a plate to make sure you are eating the right portion.  Use smaller plates, glasses, and bowls for smaller portions and to prevent overeating.  Try not to multitask. For example, avoid watching TV or using your computer while eating. If it is time to eat, sit down at a table and enjoy your food. This will help you recognize when you are full. It will also help you be more mindful of what and how much you are eating.  What are tips for following this plan?  Reading food labels  Check the calorie count compared with  the serving size. The serving size may be smaller than what you are used to eating.  Check the source of the calories. Try to choose foods that are high in protein, fiber, and vitamins, and low in saturated fat, trans fat, and sodium.  Shopping  Read nutrition labels while you shop. This will help you make healthy decisions about which foods to buy.  Pay attention to nutrition labels for low-fat or fat-free foods. These foods sometimes have the same number of calories or more calories than the full-fat versions. They also often have added sugar, starch, or salt to make up for flavor that was removed with the fat.  Make a grocery list of lower-calorie foods and stick to it.  Cooking  Try to cook your favorite foods in a healthier way. For example, try baking instead of frying.  Use low-fat dairy products.  Meal planning  Use more fruits and vegetables. One-half of your plate should be fruits and vegetables.  Include lean proteins, such as chicken, turkey, and fish.  Lifestyle  Each week, aim to do one of the followin minutes of moderate exercise, such as walking.  75 minutes of vigorous exercise, such as running.  General information  Know how many calories are in the foods you eat most often. This will help you calculate calorie counts faster.  Find a way of tracking calories that works for you. Get creative. Try different apps or programs if writing down calories does not work for you.  What foods should I eat?    Eat nutritious foods. It is better to have a nutritious, high-calorie food, such as an avocado, than a food with few nutrients, such as a bag of potato chips.  Use your calories on foods and drinks that will fill you up and will not leave you hungry soon after eating.  Examples of foods that fill you up are nuts and nut butters, vegetables, lean proteins, and high-fiber foods such as whole grains. High-fiber foods are foods with more than 5 g of fiber per serving.  Pay attention to calories in  drinks. Low-calorie drinks include water and unsweetened drinks.  The items listed above may not be a complete list of foods and beverages you can eat. Contact a dietitian for more information.  What foods should I limit?  Limit foods or drinks that are not good sources of vitamins, minerals, or protein or that are high in unhealthy fats. These include:  Candy.  Other sweets.  Sodas, specialty coffee drinks, alcohol, and juice.  The items listed above may not be a complete list of foods and beverages you should avoid. Contact a dietitian for more information.  How do I count calories when eating out?  Pay attention to portions. Often, portions are much larger when eating out. Try these tips to keep portions smaller:  Consider sharing a meal instead of getting your own.  If you get your own meal, eat only half of it. Before you start eating, ask for a container and put half of your meal into it.  When available, consider ordering smaller portions from the menu instead of full portions.  Pay attention to your food and drink choices. Knowing the way food is cooked and what is included with the meal can help you eat fewer calories.  If calories are listed on the menu, choose the lower-calorie options.  Choose dishes that include vegetables, fruits, whole grains, low-fat dairy products, and lean proteins.  Choose items that are boiled, broiled, grilled, or steamed. Avoid items that are buttered, battered, fried, or served with cream sauce. Items labeled as crispy are usually fried, unless stated otherwise.  Choose water, low-fat milk, unsweetened iced tea, or other drinks without added sugar. If you want an alcoholic beverage, choose a lower-calorie option, such as a glass of wine or light beer.  Ask for dressings, sauces, and syrups on the side. These are usually high in calories, so you should limit the amount you eat.  If you want a salad, choose a garden salad and ask for grilled meats. Avoid extra toppings such as  vega, cheese, or fried items. Ask for the dressing on the side, or ask for olive oil and vinegar or lemon to use as dressing.  Estimate how many servings of a food you are given. Knowing serving sizes will help you be aware of how much food you are eating at restaurants.  Where to find more information  Centers for Disease Control and Prevention: www.cdc.gov  [place] Department of Agriculture: myplate.gov  Summary  Calorie counting means keeping track of how many calories you eat and drink each day. If you eat fewer calories than your body needs, you should lose weight.  A healthy amount of weight to lose per week is usually 1-2 lb (0.5-0.9 kg). This usually means reducing your daily calorie intake by 500-750 calories.  The number of calories in a food can be found on a Nutrition Facts label. If a food does not have a Nutrition Facts label, try to look up the calories online or ask your dietitian for help.  Use smaller plates, glasses, and bowls for smaller portions and to prevent overeating.  Use your calories on foods and drinks that will fill you up and not leave you hungry shortly after a meal.  This information is not intended to replace advice given to you by your health care provider. Make sure you discuss any questions you have with your health care provider.  Document Revised: 01/28/2021 Document Reviewed: 01/28/2021  [employer] Patient Education © 2023 [employer]  Exercising to Lose Weight  Getting regular exercise is important for everyone. It is especially important if you are overweight. Being overweight increases your risk of heart disease, stroke, diabetes, high blood pressure, and several types of cancer. Exercising, and reducing the calories you consume, can help you lose weight and improve fitness and health.  Exercise can be moderate or vigorous intensity. To lose weight, most people need to do a certain amount of moderate or vigorous-intensity exercise each week.  How can exercise affect me?  You  lose weight when you exercise enough to burn more calories than you eat. Exercise also reduces body fat and builds muscle. The more muscle you have, the more calories you burn. Exercise also:  Improves mood.  Reduces stress and tension.  Improves your overall fitness, flexibility, and endurance.  Increases bone strength.  Moderate-intensity exercise    Moderate-intensity exercise is any activity that gets you moving enough to burn at least three times more energy (calories) than if you were sitting.  Examples of moderate exercise include:  Walking a mile in 15 minutes.  Doing light yard work.  Biking at an easy pace.  Most people should get at least 150 minutes of moderate-intensity exercise a week to maintain their body weight.  Vigorous-intensity exercise  Vigorous-intensity exercise is any activity that gets you moving enough to burn at least six times more calories than if you were sitting. When you exercise at this intensity, you should be working hard enough that you are not able to carry on a conversation.  Examples of vigorous exercise include:  Running.  Playing a team sport, such as football, basketball, and soccer.  Jumping rope.  Most people should get at least 75 minutes a week of vigorous exercise to maintain their body weight.  What actions can I take to lose weight?  The amount of exercise you need to lose weight depends on:  Your age.  The type of exercise.  Any health conditions you have.  Your overall physical ability.  Talk to your health care provider about how much exercise you need and what types of activities are safe for you.  Nutrition    Make changes to your diet as told by your health care provider or diet and nutrition specialist (dietitian). This may include:  Eating fewer calories.  Eating more protein.  Eating less unhealthy fats.  Eating a diet that includes fresh fruits and vegetables, whole grains, low-fat dairy products, and lean protein.  Avoiding foods with added fat, salt, and  sugar.  Drink plenty of water while you exercise to prevent dehydration or heat stroke.  Activity  Choose an activity that you enjoy and set realistic goals. Your health care provider can help you make an exercise plan that works for you.  Exercise at a moderate or vigorous intensity most days of the week.  The intensity of exercise may vary from person to person. You can tell how intense a workout is for you by paying attention to your breathing and heartbeat. Most people will notice their breathing and heartbeat get faster with more intense exercise.  Do resistance training twice each week, such as:  Push-ups.  Sit-ups.  Lifting weights.  Using resistance bands.  Getting short amounts of exercise can be just as helpful as long, structured periods of exercise. If you have trouble finding time to exercise, try doing these things as part of your daily routine:  Get up, stretch, and walk around every 30 minutes throughout the day.  Go for a walk during your lunch break.  Park your car farther away from your destination.  If you take public transportation, get off one stop early and walk the rest of the way.  Make phone calls while standing up and walking around.  Take the stairs instead of elevators or escalators.  Wear comfortable clothes and shoes with good support.  Do not exercise so much that you hurt yourself, feel dizzy, or get very short of breath.  Where to find more information  [place] Department of Health and Human Services: www.hhs.gov  Centers for Disease Control and Prevention: www.cdc.gov  Contact a health care provider:  Before starting a new exercise program.  If you have questions or concerns about your weight.  If you have a medical problem that keeps you from exercising.  Get help right away if:  You have any of the following while exercising:  Injury.  Dizziness.  Difficulty breathing or shortness of breath that does not go away when you stop exercising.  Chest pain.  Rapid heartbeat.  These  symptoms may represent a serious problem that is an emergency. Do not wait to see if the symptoms will go away. Get medical help right away. Call your local emergency services (911 in the [place] Do not drive yourself to the hospital.  Summary  Getting regular exercise is especially important if you are overweight.  Being overweight increases your risk of heart disease, stroke, diabetes, high blood pressure, and several types of cancer.  Losing weight happens when you burn more calories than you eat.  Reducing the amount of calories you eat, and getting regular moderate or vigorous exercise each week, helps you lose weight.  This information is not intended to replace advice given to you by your health care provider. Make sure you discuss any questions you have with your health care provider.  Document Revised: 02/13/2022 Document Reviewed: 02/13/2022  [employer] Patient Education © 2023 [employer]

## 2023-11-29 NOTE — PROGRESS NOTES
"Chief Complaint  Annual Exam and Med Refill    Subjective        HPI   DENITA presents to [facility] for  an annual wellness exam and also to review labs and refill current medications. Overall she feels well. No medication side effects are reported. The following were discussed during today's preventative wellness exam: Nutrition, Physical activity, Healthy weight, Immunizations, and Screenings     Review of Systems   Constitutional:  Positive for fatigue.   HENT: Negative.     Eyes: Negative.    Respiratory:  Negative for cough and shortness of breath.    Cardiovascular:  Positive for palpitations (improved with increased metoprolol). Negative for chest pain.   Gastrointestinal:         Abdomen crampiness at night (on estrogen patch)   Genitourinary: Negative.    Musculoskeletal:  Positive for arthralgias.   Skin:  Negative for rash.   Neurological:  Negative for dizziness.   Hematological:  Does not bruise/bleed easily.   Psychiatric/Behavioral:  Positive for sleep disturbance (works at night). Negative for dysphoric mood. The patient is not nervous/anxious.    All other systems reviewed and are negative.       Objective   Vital Signs:   Vitals:    11/29/23 1310   BP: 131/85   BP Location: Left arm   Patient Position: Sitting   Pulse: 86   Resp: 16   Temp: 97.8 °F (36.6 °C)   TempSrc: Oral   SpO2: 100%   Weight: 68 kg (150 lb)   Height: 153.7 cm (60.5\")            11/29/2023     1:12 PM   PHQ-2/PHQ-9 Depression Screening   Little Interest or Pleasure in Doing Things 0-->not at all   Feeling Down, Depressed or Hopeless 0-->not at all   PHQ-9: Brief Depression Severity Measure Score 0                 Physical Exam  Constitutional:       Appearance: Normal appearance. She is well-developed and overweight.   HENT:      Head: Normocephalic.      Right Ear: Hearing, tympanic membrane and external ear normal.      Left Ear: Hearing, tympanic membrane and external ear normal.   Eyes:      " General: Lids are normal.      Conjunctiva/sclera: Conjunctivae normal.      Pupils: Pupils are equal, round, and reactive to light.      Funduscopic exam:     Right eye: No AV nicking or papilledema.         Left eye: No AV nicking or papilledema.   Neck:      Thyroid: No thyroid mass or thyromegaly.      Vascular: No carotid bruit or JVD.      Trachea: Trachea normal.   Cardiovascular:      Rate and Rhythm: Normal rate and regular rhythm.      Pulses: Normal pulses.      Heart sounds: Normal heart sounds. No murmur heard.  Pulmonary:      Effort: Pulmonary effort is normal.      Breath sounds: Normal breath sounds.   Abdominal:      General: Bowel sounds are normal.      Palpations: Abdomen is soft.      Tenderness: There is no abdominal tenderness.   Musculoskeletal:         General: Normal range of motion.      Cervical back: Normal range of motion and neck supple.      Lumbar back: No bony tenderness.   Lymphadenopathy:      Cervical: No cervical adenopathy.      Upper Body:      Right upper body: No supraclavicular adenopathy.      Left upper body: No supraclavicular adenopathy.   Skin:     General: Skin is warm and dry.      Findings: No rash.      Nails: There is no clubbing.   Neurological:      Mental Status: She is alert and oriented to person, place, and time.      Cranial Nerves: No cranial nerve deficit.      Deep Tendon Reflexes:      Reflex Scores:       Patellar reflexes are 2+ on the right side and 2+ on the left side.  Psychiatric:         Speech: Speech normal.         Behavior: Behavior normal.         Thought Content: Thought content normal.         Judgment: Judgment normal.          Result Review :     The following data was reviewed by: Henry Jacinto MD on 11/29/2023:  TSH (11/15/2023 08:02)  Lipid Panel With / Chol / HDL Ratio (11/15/2023 08:02)  CK (11/15/2023 08:02)  CBC & Differential (11/15/2023 08:02)  Comprehensive Metabolic Panel (11/15/2023 08:02)         Assessment and Plan     Assessment & Plan  Encounter for wellness examination in adult  Flu shot administered today. Information on RSV and COVID shared and after visit summary. Continue with healthy diet/exercise with attention to weight loss   Essential hypertension  Condition is stable. The current medical regimen is effective;  continue present plan and medications.  Pure hypercholesterolemia  Condition is stable. The current medical regimen is effective;  continue present plan and medications.  Immunization due  Flu shot given  Gastroesophageal reflux disease without esophagitis  Continue OTC lansoprazole 15 mg a.m. dosing    New Medications Ordered This Visit   Medications    metoprolol succinate XL (TOPROL-XL) 25 MG 24 hr tablet     Sig: Take 1 tablet by mouth Daily.     Dispense:  90 tablet     Refill:  3    rosuvastatin (CRESTOR) 20 MG tablet     Sig: Take 1 tablet by mouth Every Night.     Dispense:  90 tablet     Refill:  3    telmisartan-hydrochlorothiazide (MICARDIS HCT) 80-25 MG per tablet     Sig: Take 1 tablet by mouth Daily.     Dispense:  90 tablet     Refill:  3      Orders Placed This Encounter   Procedures    Fluzone >6 Months ([id number])          Follow Up   Return in about 1 year (around 11/29/2024) for Adult wellness & medication appt, schedule 30 min.  Patient was given instructions and counseling regarding her condition or for health maintenance advice. Please see specific information pulled into the AVS if appropriate.

## 2024-03-12 ENCOUNTER — LAB (OUTPATIENT)
Dept: OTHER | Facility: HOSPITAL | Age: 59
End: 2024-03-12
Payer: COMMERCIAL

## 2024-03-12 ENCOUNTER — OFFICE VISIT (OUTPATIENT)
Dept: ONCOLOGY | Facility: CLINIC | Age: 59
End: 2024-03-12
Payer: COMMERCIAL

## 2024-03-12 VITALS
HEIGHT: 60 IN | HEART RATE: 61 BPM | RESPIRATION RATE: 16 BRPM | OXYGEN SATURATION: 98 % | BODY MASS INDEX: 28.41 KG/M2 | WEIGHT: 144.7 LBS | TEMPERATURE: 97.8 F | DIASTOLIC BLOOD PRESSURE: 75 MMHG | SYSTOLIC BLOOD PRESSURE: 105 MMHG

## 2024-03-12 DIAGNOSIS — D72.820 LYMPHOCYTOSIS: ICD-10-CM

## 2024-03-12 DIAGNOSIS — D72.820 LYMPHOCYTOSIS: Primary | ICD-10-CM

## 2024-03-12 LAB
BASOPHILS # BLD AUTO: 0.07 10*3/MM3 (ref 0–0.2)
BASOPHILS NFR BLD AUTO: 0.7 % (ref 0–1.5)
DEPRECATED RDW RBC AUTO: 42.1 FL (ref 37–54)
EOSINOPHIL # BLD AUTO: 0.21 10*3/MM3 (ref 0–0.4)
EOSINOPHIL NFR BLD AUTO: 2.1 % (ref 0.3–6.2)
ERYTHROCYTE [DISTWIDTH] IN BLOOD BY AUTOMATED COUNT: 12.1 % (ref 12.3–15.4)
HCT VFR BLD AUTO: 38.4 % (ref 34–46.6)
HGB BLD-MCNC: 13.1 G/DL (ref 12–15.9)
IMM GRANULOCYTES # BLD AUTO: 0.03 10*3/MM3 (ref 0–0.05)
IMM GRANULOCYTES NFR BLD AUTO: 0.3 % (ref 0–0.5)
LYMPHOCYTES # BLD AUTO: 3.54 10*3/MM3 (ref 0.7–3.1)
LYMPHOCYTES NFR BLD AUTO: 36 % (ref 19.6–45.3)
MCH RBC QN AUTO: 31.9 PG (ref 26.6–33)
MCHC RBC AUTO-ENTMCNC: 34.1 G/DL (ref 31.5–35.7)
MCV RBC AUTO: 93.4 FL (ref 79–97)
MONOCYTES # BLD AUTO: 0.82 10*3/MM3 (ref 0.1–0.9)
MONOCYTES NFR BLD AUTO: 8.3 % (ref 5–12)
NEUTROPHILS NFR BLD AUTO: 5.17 10*3/MM3 (ref 1.7–7)
NEUTROPHILS NFR BLD AUTO: 52.6 % (ref 42.7–76)
NRBC BLD AUTO-RTO: 0 /100 WBC (ref 0–0.2)
PLATELET # BLD AUTO: 219 10*3/MM3 (ref 140–450)
PMV BLD AUTO: 11.1 FL (ref 6–12)
RBC # BLD AUTO: 4.11 10*6/MM3 (ref 3.77–5.28)
WBC NRBC COR # BLD AUTO: 9.84 10*3/MM3 (ref 3.4–10.8)

## 2024-03-12 PROCEDURE — 99213 OFFICE O/P EST LOW 20 MIN: CPT | Performed by: INTERNAL MEDICINE

## 2024-03-12 PROCEDURE — 85025 COMPLETE CBC W/AUTO DIFF WBC: CPT | Performed by: INTERNAL MEDICINE

## 2024-03-12 PROCEDURE — 36415 COLL VENOUS BLD VENIPUNCTURE: CPT

## 2024-03-12 NOTE — PROGRESS NOTES
.     REASON FOR FOLLOWUP : Leukocytosis and lymphocytosis    HISTORY OF PRESENT ILLNESS:  The patient is a 58 y.o. year old female  who is here for follow-up with the above-mentioned history.     No new issues.  No significant infections.  No fever, chills, weight loss,     No change in night sweats    Past Medical History:   Diagnosis Date    Abnormal     Allergic     Arthritis     GERD (gastroesophageal reflux disease)     H/O Anal skin tag     History of colon polyp     History of snoring     Hyperlipidemia     Hypertension     LBBB (left bundle branch block)     Migraine     PVC's (premature ventricular contractions)     Raynaud's syndrome     Seasonal allergies     Sensory neuropathy, positional, left upper extremity 2018     Past Surgical History:   Procedure Laterality Date     SECTION      failure to progress    COLONOSCOPY      One polyp removed       MEDICATIONS    Current Outpatient Medications:     Calcium Carbonate-Vitamin D3 600-400 MG-UNIT tablet, Take 2 tablets by mouth Daily., Disp: , Rfl:     CombiPatch 0.05-0.25 MG/DAY, , Disp: , Rfl:     lansoprazole (PREVACID) 15 MG capsule, Take 1 capsule by mouth Every Morning., Disp: , Rfl:     metoprolol succinate XL (TOPROL-XL) 25 MG 24 hr tablet, Take 1 tablet by mouth Daily., Disp: 90 tablet, Rfl: 3    Multiple Vitamins-Minerals (MULTIVITAMIN ADULT PO), Take  by mouth., Disp: , Rfl:     rosuvastatin (CRESTOR) 20 MG tablet, Take 1 tablet by mouth Every Night., Disp: 90 tablet, Rfl: 3    telmisartan-hydrochlorothiazide (MICARDIS HCT) 80-25 MG per tablet, Take 1 tablet by mouth Daily., Disp: 90 tablet, Rfl: 3    lansoprazole (PREVACID) 15 MG capsule, Take 1 capsule by mouth Before Breakfast., Disp: , Rfl:     ALLERGIES:   No Known Allergies    SOCIAL HISTORY:       Social History     Socioeconomic History    Marital status:      Spouse name: Khang    Number of children: 1   Tobacco Use    Smoking status: Never      "Passive exposure: Never    Smokeless tobacco: Never   Vaping Use    Vaping status: Never Used   Substance and Sexual Activity    Alcohol use: Yes     Alcohol/week: 3.0 standard drinks of alcohol     Types: 3 Glasses of wine per week     Comment: occasionally.    Drug use: Never    Sexual activity: Yes     Partners: Male     Birth control/protection: Post-menopausal         FAMILY HISTORY:  Family History   Problem Relation Age of Onset    Diabetes Mother     Diabetes type II Mother     Hypertension Father     Hyperlipidemia Father        REVIEW OF SYSTEMS:  Review of Systems   Constitutional:  Negative for activity change.   HENT:  Negative for nosebleeds and trouble swallowing.    Respiratory:  Negative for shortness of breath and wheezing.    Cardiovascular:  Negative for chest pain and palpitations.   Gastrointestinal:  Negative for constipation, diarrhea and nausea.   Genitourinary:  Negative for dysuria and hematuria.   Musculoskeletal:  Negative for arthralgias and myalgias.   Skin:  Negative for rash and wound.   Neurological:  Negative for seizures and syncope.   Hematological:  Negative for adenopathy. Does not bruise/bleed easily.   Psychiatric/Behavioral:  Negative for confusion.               Vitals:    03/12/24 0827   BP: 105/75   Pulse: 61   Resp: 16   Temp: 97.8 °F (36.6 °C)   TempSrc: Temporal   SpO2: 98%   Weight: 65.6 kg (144 lb 11.2 oz)   Height: 153 cm (60.24\")   PainSc: 0-No pain         3/12/2024     8:29 AM   Current Status   ECOG score 0      PHYSICAL EXAM:          CONSTITUTIONAL:  Vital signs reviewed.  No distress, looks comfortable.  EYES:  Conjunctiva and lids unremarkable.  PERRLA  EARS,NOSE,MOUTH,THROAT:  Ears and nose appear unremarkable.  Lips, teeth, gums appear unremarkable.  RESPIRATORY:  Normal respiratory effort.  Lungs clear to auscultation bilaterally.  CARDIOVASCULAR:  Normal S1, S2.  No murmurs rubs or gallops.  No significant lower extremity edema.  GASTROINTESTINAL: " Abdomen appears unremarkable.  Nontender.  No hepatomegaly.  No splenomegaly.  LYMPHATIC:  No cervical, supraclavicular, axillary lymphadenopathy.  SKIN:  Warm.  No rashes.  PSYCHIATRIC:  Normal judgment and insight.  Normal mood and affect.       RECENT LABS:        WBC   Date Value Ref Range Status   03/12/2024 9.84 3.40 - 10.80 10*3/mm3 Final   11/15/2023 7.9 3.4 - 10.8 x10E3/uL Final   09/12/2023 8.47 3.40 - 10.80 10*3/mm3 Final   03/28/2023 9.40 3.40 - 10.80 10*3/mm3 Final   11/21/2022 8.17 3.40 - 10.80 10*3/mm3 Final   09/27/2022 12.03 (H) 3.40 - 10.80 10*3/mm3 Final   09/21/2022 10.65 3.40 - 10.80 10*3/mm3 Final   08/12/2022 11.5 (H) 3.4 - 10.8 x10E3/uL Final   02/24/2022 10.0 3.4 - 10.8 x10E3/uL Final   02/16/2022 11.53 (H) 3.40 - 10.80 10*3/mm3 Final   06/21/2021 7.5 3.4 - 10.8 x10E3/uL Final   03/12/2021 6.54 3.40 - 10.80 10*3/mm3 Final   03/11/2020 6.4 3.4 - 10.8 x10E3/uL Final   03/28/2019 8.20 3.40 - 10.80 10*3/mm3 Final   04/06/2018 9.62 4.50 - 10.70 10*3/mm3 Final     Hemoglobin   Date Value Ref Range Status   03/12/2024 13.1 12.0 - 15.9 g/dL Final   11/15/2023 13.5 11.1 - 15.9 g/dL Final   09/12/2023 13.1 12.0 - 15.9 g/dL Final   03/28/2023 14.1 12.0 - 15.9 g/dL Final   11/21/2022 13.2 12.0 - 15.9 g/dL Final   09/27/2022 14.0 12.0 - 15.9 g/dL Final   09/21/2022 14.0 12.0 - 15.9 g/dL Final   08/12/2022 13.9 11.1 - 15.9 g/dL Final   02/24/2022 12.5 11.1 - 15.9 g/dL Final   02/16/2022 14.5 12.0 - 15.9 g/dL Final   06/21/2021 11.9 11.1 - 15.9 g/dL Final   03/12/2021 14.8 12.0 - 15.9 g/dL Final   03/11/2020 12.9 11.1 - 15.9 g/dL Final   03/28/2019 13.0 12.0 - 15.9 g/dL Final   04/06/2018 13.6 11.9 - 15.5 g/dL Final     Platelets   Date Value Ref Range Status   03/12/2024 219 140 - 450 10*3/mm3 Final   11/15/2023 234 150 - 450 x10E3/uL Final   09/12/2023 181 140 - 450 10*3/mm3 Final   03/28/2023 218 140 - 450 10*3/mm3 Final   11/21/2022 244 140 - 450 10*3/mm3 Final   09/27/2022 252 140 - 450 10*3/mm3 Final    09/21/2022 232 140 - 450 10*3/mm3 Final   08/12/2022 307 150 - 450 x10E3/uL Final   02/24/2022 251 150 - 450 x10E3/uL Final   02/16/2022 329 140 - 450 10*3/mm3 Final   06/21/2021 219 150 - 450 x10E3/uL Final   03/12/2021 291 140 - 450 10*3/mm3 Final   03/11/2020 273 150 - 450 x10E3/uL Final   03/28/2019 269 140 - 450 10*3/mm3 Final   04/06/2018 286 140 - 500 10*3/mm3 Final       Assessment & Plan   There are no diagnoses linked to this encounter.        Radha Higgins   *Lymphocytosis  Although lymphocyte percent is normal, ALC intermittently elevated and when not elevated, usually upper limits of normal  ALC 3540, from 2820, from 3380, from 3970    *Leukocytosis, intermittent  WBC was mostly 6.5-9.5 April 2018-June 2021.  WBC 10-11.5 since February 2022   WBC 9.8, from 8.5, from 9.4, from 12    *Etiology of the above  Hb and PLT unremarkable.  I told patient and  this could be a very early CLL or lymphoma involving the blood.  I told her if this is the case, I would not expect it to need any treatment.  Peripheral blood flow 9/21/2022: Negative    *Night sweats almost every night since around 2020 or so (hair gets wet, but does not change her clothes).    She attributes this to menopause  These remain unchanged    Plan  She declines follow-up with me.  Considering peripheral blood flow cytometry was negative, I think that is reasonable.

## 2024-05-29 DIAGNOSIS — I10 ESSENTIAL HYPERTENSION: ICD-10-CM

## 2024-05-29 RX ORDER — METOPROLOL SUCCINATE 25 MG/1
25 TABLET, EXTENDED RELEASE ORAL DAILY
Qty: 90 TABLET | Refills: 3 | Status: SHIPPED | OUTPATIENT
Start: 2024-05-29 | End: 2025-05-29

## 2024-05-29 NOTE — TELEPHONE ENCOUNTER
Patient is requesting a refill on the Metoprolol Succ 25 mg daily to be sent to [facility].    Nov-06/06/24-CPS  LOV-05/12/23-RM    PVCs: She has palpitations with these.  She is having more PVCs, increase metoprolol succinate to 25 mg daily.  PSVT: Rare  Left bundle branch block.  Echocardiogram in March 2022 showed EF 62.5%, grade 2 LV diastolic dysfunction with high LAP, and trace mitral and tricuspid insufficiency.  She denies chest pain.  Her ECG is stable.  Hypertension controlled on telmisartan-HCTZ and metoprolol succinate.  Hyperlipidemia: She is treated with 20 mg Crestor daily.        Plan:       See Razia in 1 year, increase metoprolol due to more PVCs, overall though doing better, advised exercise.

## 2024-06-06 ENCOUNTER — OFFICE VISIT (OUTPATIENT)
Dept: CARDIOLOGY | Facility: CLINIC | Age: 59
End: 2024-06-06
Payer: COMMERCIAL

## 2024-06-06 VITALS
WEIGHT: 145 LBS | HEIGHT: 60 IN | DIASTOLIC BLOOD PRESSURE: 80 MMHG | SYSTOLIC BLOOD PRESSURE: 130 MMHG | OXYGEN SATURATION: 100 % | BODY MASS INDEX: 28.47 KG/M2 | HEART RATE: 63 BPM

## 2024-06-06 DIAGNOSIS — I10 ESSENTIAL HYPERTENSION: Primary | ICD-10-CM

## 2024-06-06 PROCEDURE — 99214 OFFICE O/P EST MOD 30 MIN: CPT | Performed by: NURSE PRACTITIONER

## 2024-06-06 PROCEDURE — 93000 ELECTROCARDIOGRAM COMPLETE: CPT | Performed by: NURSE PRACTITIONER

## 2024-06-06 NOTE — PROGRESS NOTES
[facility] Follow Up Office Note     Encounter Date:24  Patient:Radha Higgins  :1965  MRN:[id number]      Chief Complaint: No chief complaint on file.        History of Presenting Illness:        Radha Higgins is a 58 y.o. female who is here for follow-up.  She is a patient of Dr Kaur.  He previously saw Dr. Conklin.    Patient has past medical history significant for hypertension, PVCs, intraventricular conduction delay, hyperlipidemia, Raynaud's disease.    Patient saw Dr. Kaur in May 2023.  She was having more PVCs and her metoprolol succinate was increased.    Patient reports in general she is doing well.  She is having notably fewer PVCs than she was this time last year.  She is not having chest pain, dyspnea on exertion, lower extremity edema, PND orthopnea.    Review of Systems:  Review of Systems   Cardiovascular:  Negative for chest pain, dyspnea on exertion, leg swelling, orthopnea and palpitations.   Respiratory:  Negative for shortness of breath.        Current Outpatient Medications on File Prior to Visit   Medication Sig Dispense Refill    Calcium Carbonate-Vitamin D3 600-400 MG-UNIT tablet Take 2 tablets by mouth Daily.      CombiPatch 0.05-0.25 MG/DAY       lansoprazole (PREVACID) 15 MG capsule Take 1 capsule by mouth Every Morning.      metoprolol succinate XL (TOPROL-XL) 25 MG 24 hr tablet Take 1 tablet by mouth Daily. 90 tablet 3    Multiple Vitamins-Minerals (MULTIVITAMIN ADULT PO) Take  by mouth.      rosuvastatin (CRESTOR) 20 MG tablet Take 1 tablet by mouth Every Night. 90 tablet 3    telmisartan-hydrochlorothiazide (MICARDIS HCT) 80-25 MG per tablet Take 1 tablet by mouth Daily. 90 tablet 3    [DISCONTINUED] acetaminophen (TYLENOL) 500 MG tablet Take 1 tablet by mouth Every 6 (Six) Hours As Needed for Mild Pain.      [DISCONTINUED] amoxicillin (AMOXIL) 500 MG capsule       [DISCONTINUED] benzonatate (TESSALON) 200 MG capsule Take 1 capsule by mouth 3  (Three) Times a Day As Needed for Cough. 30 capsule 0    [DISCONTINUED] guaiFENesin (MUCINEX) 600 MG 12 hr tablet Take 2 tablets by mouth 2 (Two) Times a Day.      [DISCONTINUED] guaiFENesin-codeine (GUAIFENESIN AC) 100-10 MG/5ML liquid Take 5 mL by mouth 3 (Three) Times a Day As Needed for Cough. 90 mL 0    [DISCONTINUED] ibuprofen (ADVIL,MOTRIN) 200 MG tablet Take 1 tablet by mouth Every 6 (Six) Hours As Needed for Mild Pain.      [DISCONTINUED] methylPREDNISolone (MEDROL) 4 MG dose pack Take as directed on package instructions. 21 tablet 0     No current facility-administered medications on file prior to visit.       No Known Allergies    Past Medical History:   Diagnosis Date    Abnormal     Allergic     Arthritis     GERD (gastroesophageal reflux disease)     H/O Anal skin tag     History of colon polyp     History of snoring     Hyperlipidemia     Hypertension     LBBB (left bundle branch block)     Migraine     PVC's (premature ventricular contractions)     Raynaud's syndrome     Seasonal allergies     Sensory neuropathy, positional, left upper extremity 2018       Past Surgical History:   Procedure Laterality Date     SECTION      failure to progress    COLONOSCOPY      One polyp removed       Social History     Socioeconomic History    Marital status:      Spouse name: Khang    Number of children: 1   Tobacco Use    Smoking status: Never     Passive exposure: Never    Smokeless tobacco: Never   Vaping Use    Vaping status: Never Used   Substance and Sexual Activity    Alcohol use: Yes     Alcohol/week: 3.0 standard drinks of alcohol     Types: 3 Glasses of wine per week     Comment: occasionally.    Drug use: Never    Sexual activity: Yes     Partners: Male     Birth control/protection: Post-menopausal       Family History   Problem Relation Age of Onset    Diabetes Mother     Diabetes type II Mother     Hypertension Father     Hyperlipidemia Father        The following  "portions of the patient's history were reviewed and updated as appropriate: allergies, current medications, past family history, past medical history, past social history, past surgical history and problem list.       Objective:       Vitals:    06/06/24 1111   BP: 130/80   Pulse: 63   SpO2: 100%   Weight: 65.8 kg (145 lb)   Height: 153 cm (60.25\")         Physical Exam:  Constitutional: Well appearing, well developed, no acute distress   HENT: Oropharynx clear and membrane moist  Eyes: Normal conjunctiva, no sclera icterus  Neck: Supple, no carotid bruit bilaterally  Cardiovascular: Regular rate and rhythm, No Murmur, No bilateral lower extremity edema  Pulmonary: Normal respiratory effort, normal lung sounds, no wheezing  Neurological: Alert and orient x 3  Skin: Warm, dry, no ecchymosis, no rash  Psych: Appropriate mood and affect. Normal judgment and insight         Lab Results   Component Value Date     11/15/2023     11/21/2022    K 4.0 11/15/2023    K 4.3 11/21/2022    CL 99 11/15/2023     11/21/2022    CO2 25 11/15/2023    CO2 27.6 11/21/2022    BUN 19 11/15/2023    BUN 14 11/21/2022    CREATININE 0.82 11/15/2023    CREATININE 0.78 11/21/2022    EGFRIFNONA 80 02/24/2022    EGFRIFNONA 63 02/16/2022    EGFRIFAFRI 93 02/24/2022    EGFRIFAFRI 99 06/21/2021    GLUCOSE 99 11/15/2023    GLUCOSE 99 11/21/2022    CALCIUM 9.6 11/15/2023    CALCIUM 9.6 11/21/2022    PROTENTOTREF 7.2 11/15/2023    PROTENTOTREF 7.2 11/21/2022    ALBUMIN 4.8 11/15/2023    ALBUMIN 4.80 11/21/2022    BILITOT 0.5 11/15/2023    BILITOT 0.5 11/21/2022    AST 23 11/15/2023    AST 21 11/21/2022    ALT 18 11/15/2023    ALT 16 11/21/2022     Lab Results   Component Value Date    WBC 9.84 03/12/2024    WBC 7.9 11/15/2023    HGB 13.1 03/12/2024    HGB 13.5 11/15/2023    HCT 38.4 03/12/2024    HCT 40.9 11/15/2023    MCV 93.4 03/12/2024    MCV 96 11/15/2023     03/12/2024     11/15/2023     Lab Results   Component Value " "Date    TRIG 60 11/15/2023    TRIG 92 11/21/2022    HDL 59 11/15/2023    HDL 66 (H) 11/21/2022    LDL 59 11/15/2023    LDL 66 11/21/2022     No results found for: \"PROBNP\", \"BNP\"  Lab Results   Component Value Date    CKTOTAL 84 11/15/2023    TROPONINT <0.010 02/16/2022     Lab Results   Component Value Date    TSH 3.260 11/15/2023    TSH 3.070 02/16/2022           ECG 12 Lead    Date/Time: 6/6/2024 11:30 AM  Performed by: Tracie Bardales APRN    Authorized by: Tracie Bardales APRN  Comparison: compared with previous ECG from 5/12/2023  Similar to previous ECG  Rhythm: sinus rhythm  Rate: normal  BPM: 67  Conduction: non-specific intraventricular conduction delay             Assessment:          Diagnosis Plan   1. Essential hypertension  ECG 12 Lead             Plan:       PVCs -improved on increase metoprolol succinate.  Continue the same  Paroxysmal SVT -noted rare occasions on monitor.  No related symptoms  Left bundle branch block -chronic, no anginal symptoms.  Echocardiogram with normal LVEF from March 2022  Hypertension -controlled on present regimen  Hyperlipidemia -appropriately treated on 20 mg of rosuvastatin    Patient seen today for follow-up.  Symptoms are stable and I am not recommending any changes    Follow-up with Dr. Kaur in 1 year      Orders Placed This Encounter   Procedures    ECG 12 Lead     This order was created via procedure documentation     Order Specific Question:   Release to patient     Answer:   Routine Release [[id number]]            FLACA Hurt  [facility]  06/06/24  11:38 EDT     "

## 2024-07-01 ENCOUNTER — OFFICE VISIT (OUTPATIENT)
Dept: FAMILY MEDICINE CLINIC | Facility: CLINIC | Age: 59
End: 2024-07-01
Payer: COMMERCIAL

## 2024-07-01 VITALS
HEART RATE: 72 BPM | WEIGHT: 147 LBS | SYSTOLIC BLOOD PRESSURE: 124 MMHG | OXYGEN SATURATION: 99 % | HEIGHT: 60 IN | BODY MASS INDEX: 28.86 KG/M2 | DIASTOLIC BLOOD PRESSURE: 80 MMHG

## 2024-07-01 DIAGNOSIS — H93.11 TINNITUS OF RIGHT EAR: ICD-10-CM

## 2024-07-01 DIAGNOSIS — M72.2 PLANTAR FASCIITIS: ICD-10-CM

## 2024-07-01 DIAGNOSIS — H65.01 RIGHT ACUTE SEROUS OTITIS MEDIA, RECURRENCE NOT SPECIFIED: Primary | ICD-10-CM

## 2024-07-01 PROBLEM — S30.861A TICK BITE OF ABDOMINAL WALL: Status: RESOLVED | Noted: 2023-05-04 | Resolved: 2024-07-01
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
## 2024-07-01 PROCEDURE — 99214 OFFICE O/P EST MOD 30 MIN: CPT | Performed by: FAMILY MEDICINE

## 2024-07-01 RX ORDER — MELOXICAM 15 MG/1
15 TABLET ORAL DAILY
Qty: 30 TABLET | Refills: 1 | Status: SHIPPED | OUTPATIENT
Start: 2024-07-01 | End: 2024-08-30

## 2024-07-01 RX ORDER — ROSUVASTATIN CALCIUM 20 MG/1
20 TABLET, COATED ORAL NIGHTLY
Qty: 90 TABLET | Refills: 3 | Status: CANCELLED | OUTPATIENT
Start: 2024-07-01 | End: 2025-07-01

## 2024-07-01 RX ORDER — FLUTICASONE PROPIONATE 50 MCG
2 SPRAY, SUSPENSION (ML) NASAL DAILY
Start: 2024-07-01 | End: 2025-07-01

## 2024-07-01 RX ORDER — TELMISARTAN AND HYDROCHLORTHIAZIDE 80; 25 MG/1; MG/1
1 TABLET ORAL DAILY
Qty: 90 TABLET | Refills: 3 | Status: CANCELLED | OUTPATIENT
Start: 2024-07-01 | End: 2025-07-01

## 2024-07-01 NOTE — PROGRESS NOTES
"Chief Complaint  Tinnitus and Foot Pain ([place], when walking)    Subjective        Tinnitus       History of Present Illness  The patient is a 58-year-old female who presents for evaluation of multiple medical concerns.    In 05/2023, the patient experienced jaw soreness that extended to her ear. Initially, she was uncertain whether her symptoms were related to a tooth issue, temporomandibular joint disorder, or an ear infection. A dental examination revealed a potential dental issue. Following a 20-day course of antibiotics, she underwent a root canal procedure. Subsequently, she became ill while caring for her grandson, during which she experienced fluid accumulation in her ears. Consequently, she was prescribed a steroid to facilitate ear opening. Currently, she reports tinnitus in her right ear, particularly at night and upon waking in the morning. She denies any jaw pain. Prior to this episode, she experienced intermittent clicking sounds in her ears for several years. She denies exposure to loud noises. She wears a headset for her workplace. During her youth, a box fell on her head, resulting in a ruptured eardrum. She occasionally experiences a sensation akin to fluid accumulation in her ear. She does not use Flonase, but she does take a generic Claritin.    The patient suspects she may have plantar fasciitis, more pronounced in her right foot. She reports difficulty walking upon waking and during the day. She has purchased a roller and uses a water bottle for relief. She engages in regular exercise. She has purchased arch supports,but walks barefoot at home alot. She reports feeling better when wearing shoes.    Review of Systems   HENT:  Positive for tinnitus.         Vital Signs:   Vitals:    07/01/24 0841   BP: 124/80   Pulse: 72   SpO2: 99%   Weight: 66.7 kg (147 lb)   Height: 152.4 cm (60\")            3/12/2024     8:30 AM   PHQ-2/PHQ-9 Depression Screening   Little Interest or Pleasure in Doing Things " 0-->not at all   Feeling Down, Depressed or Hopeless 0-->not at all   PHQ-9: Brief Depression Severity Measure Score 0                 Physical Exam  Constitutional:       General: She is not in acute distress.  Musculoskeletal:        Feet:    Feet:      Comments: Area of tenderness  Neurological:      Mental Status: She is alert.       Physical Exam  Right ear shows a slightly whitish color of the eardrum. Left ear has a normal translucent color. Both ear canals are normal. TMJs are not tender. Oral examination is normal. Posterior pharynx is normal. Tongue examination is normal.  No anterior, posterior, preauricular, or submental lymph nodes are palpable.               Results                  Assessment and Plan     Orders Placed This Encounter   Procedures    Ambulatory Referral to ENT (Otolaryngology)     New Medications Ordered This Visit   Medications    fluticasone (FLONASE) 50 MCG/ACT nasal spray     Si sprays into the nostril(s) as directed by provider Daily.    meloxicam (MOBIC) 15 MG tablet     Sig: Take 1 tablet by mouth Daily for 60 days.     Dispense:  30 tablet     Refill:  1     Assessment & Plan  1. Tinnitus, right ear and subacute serous otitis media of the right ear.  Flonase, to be administered as 2 sprays per nostril daily, has been prescribed. A referral to an ENT specialist has been made.    2. Bilateral plantar fasciitis, right greater than left.  Gentle stretches for 10 minutes and heat for 10 minutes before any weightbearing in the morning have been recommended. Meloxicam 15 mg daily after supper as needed has been prescribed. Information on plantar fasciitis has been shared in the after-visit summary.    Follow-up  Follow-up visits will be scheduled as necessary.         Patient was given instructions and counseling regarding her condition or for health maintenance advice. Please see specific information pulled into the AVS if appropriate.     Patient or patient representative  verbalized consent for the use of Ambient Listening during the visit with  Henry Jacinto MD for chart documentation. 7/1/2024  09:23 EDT

## 2024-07-16 ENCOUNTER — TELEPHONE (OUTPATIENT)
Dept: FAMILY MEDICINE CLINIC | Facility: CLINIC | Age: 59
End: 2024-07-16
Payer: COMMERCIAL

## 2024-07-16 NOTE — TELEPHONE ENCOUNTER
Caller: Radha Higgins    Relationship: Self    Best call back number: [phone]     What is the medical concern/diagnosis:      What specialty or service is being requested:      What is the provider, practice or medical service name:      What is the office location:      What is the office phone number:      Any additional details: PATIENT CALLED AND NEEDS UPDATE ON THE REFERRAL FOR ENT.  PLEASE CALL HER BACK.

## 2024-07-16 NOTE — TELEPHONE ENCOUNTER
Spoke with patient to provide her with the provider information that [phone]. Informed her to contact providers office for an update with scheduling. Patient voiced understanding and had no further question.

## 2024-09-06 ENCOUNTER — PATIENT MESSAGE (OUTPATIENT)
Dept: FAMILY MEDICINE CLINIC | Facility: CLINIC | Age: 59
End: 2024-09-06
Payer: COMMERCIAL

## 2024-09-06 DIAGNOSIS — M72.2 PLANTAR FASCIITIS: Primary | ICD-10-CM

## 2024-09-09 RX ORDER — MELOXICAM 15 MG/1
15 TABLET ORAL DAILY
Qty: 30 TABLET | Refills: 0 | Status: SHIPPED | OUTPATIENT
Start: 2024-09-09 | End: 2024-10-09

## 2024-10-09 ENCOUNTER — OFFICE VISIT (OUTPATIENT)
Dept: FAMILY MEDICINE CLINIC | Facility: CLINIC | Age: 59
End: 2024-10-09
Payer: COMMERCIAL

## 2024-10-09 VITALS
HEART RATE: 87 BPM | WEIGHT: 145 LBS | BODY MASS INDEX: 28.47 KG/M2 | OXYGEN SATURATION: 98 % | DIASTOLIC BLOOD PRESSURE: 80 MMHG | SYSTOLIC BLOOD PRESSURE: 126 MMHG | HEIGHT: 60 IN

## 2024-10-09 DIAGNOSIS — M72.2 PLANTAR FASCIITIS: Primary | ICD-10-CM

## 2024-10-09 PROCEDURE — 99213 OFFICE O/P EST LOW 20 MIN: CPT | Performed by: FAMILY MEDICINE

## 2024-10-09 RX ORDER — MELOXICAM 15 MG/1
15 TABLET ORAL DAILY
Qty: 30 TABLET | Refills: 1 | Status: SHIPPED | OUTPATIENT
Start: 2024-10-09 | End: 2024-12-08

## 2024-10-09 NOTE — PROGRESS NOTES
"Chief Complaint  Plantar Fasciitis    Subjective        Plantar Fasciitis          The patient presents for evaluation of bilateral foot pain.    She reports experiencing pain in both feet, which has not improved since her last visit three months ago. During that visit, she was prescribed meloxicam and given specific instructions. She completed a full 60-day course of meloxicam, which provided some relief, but the pain returned once the medication was discontinued. The medication also alleviated discomfort in her thumb.    She describes her gait as unusual due to the pain and notes that she can only wear two pairs of shoes comfortably. She recalls a sharp pain on the side of her foot that has since healed, but now experiences a band-like sensation. She expresses concern about potentially exacerbating her condition due to her footwear. She also reports a clicking sound in her ankle and perceives it as weak.    She has not undergone any physical therapy for her feet. She had plans to retire on 08/01/2024 and intended to stay active by walking in [place], but her current condition has made walking difficult. She used to walk a couple of miles daily without issue.           Vital Signs:   Vitals:    10/09/24 1518   BP: 126/80   Pulse: 87   SpO2: 98%   Weight: 65.8 kg (145 lb)   Height: 152.4 cm (60\")            3/12/2024     8:30 AM   PHQ-2/PHQ-9 Depression Screening   Little Interest or Pleasure in Doing Things 0-->not at all   Feeling Down, Depressed or Hopeless 0-->not at all   PHQ-9: Brief Depression Severity Measure Score 0                 Physical Exam     Musculoskeletal exam is consistent with bilateral plantar fasciitis with tender spots at the insertion of the plantar fascia at the medial aspect of the heel, left greater than right.             Results                  Assessment and Plan     Orders Placed This Encounter   Procedures    Ambulatory Referral to Orthopedic Surgery     New Medications Ordered This " Visit   Medications    meloxicam (MOBIC) 15 MG tablet     Sig: Take 1 tablet by mouth Daily for 60 days.     Dispense:  30 tablet     Refill:  1        1. Bilateral plantar fasciitis.  The patient's symptoms persist despite initial treatment with meloxicam. She reports that the medication helps alleviate pain in her feet and thumb but does not provide a complete resolution. Physical exam findings are consistent with bilateral plantar fasciitis, with tender spots at the insertion of the plantar fascia at the medial aspect of the heel, left greater than right. A referral has been made to orthopedist Dr. Je Encarnacion for further evaluation, likely including an x-ray examination. She will continue meloxicam with a prescription for 30 tablets and a refill sent to [facility], [place].   .     Return if symptoms worsen or fail to improve.     Patient was given instructions and counseling regarding her condition or for health maintenance advice. Please see specific information pulled into the AVS if appropriate.     Patient or patient representative verbalized consent for the use of Ambient Listening during the visit with  Henry Jacinto MD for chart documentation. 10/9/2024  15:47 EDT

## 2024-11-20 ENCOUNTER — TELEPHONE (OUTPATIENT)
Dept: FAMILY MEDICINE CLINIC | Facility: CLINIC | Age: 59
End: 2024-11-20

## 2024-11-20 DIAGNOSIS — E78.00 PURE HYPERCHOLESTEROLEMIA: Primary | ICD-10-CM

## 2024-11-20 DIAGNOSIS — I10 ESSENTIAL HYPERTENSION: ICD-10-CM

## 2024-11-20 NOTE — TELEPHONE ENCOUNTER
Caller: Radha Higgins    Relationship: Self    Best call back number:[phone]    What orders are you requesting (i.e. lab or imaging): LABS FOR PHYSICAL    In what timeframe would the patient need to come in: BEFORE PHYSICAL ON 12/2/24    Where will you receive your lab/imaging services: [facility]-INFORMATION NOTED BELOW    Additional notes: PATIENT IS NEEDING LABS DONE FOR HER PHYSICAL AND IS REQUESTING THE LAB ORDERS BE PLACED SO SHE CAN GO. PATIENT IS WANTING TO GO TO A [facility] IN [place], [place]. INFORMATION IS BELOW.PLEASE CALLBACK ONCE ORDERS ARE ACTIVE SO PATIENT CAN KNOW WHEN SHE CAN GO .      [facility]  Address: [address], [place] [address]  Phone: [phone]

## 2024-12-02 ENCOUNTER — OFFICE VISIT (OUTPATIENT)
Dept: FAMILY MEDICINE CLINIC | Facility: CLINIC | Age: 59
End: 2024-12-02
Payer: COMMERCIAL

## 2024-12-02 VITALS
DIASTOLIC BLOOD PRESSURE: 68 MMHG | BODY MASS INDEX: 28.47 KG/M2 | HEIGHT: 60 IN | OXYGEN SATURATION: 98 % | WEIGHT: 145 LBS | HEART RATE: 74 BPM | SYSTOLIC BLOOD PRESSURE: 106 MMHG

## 2024-12-02 DIAGNOSIS — E78.00 PURE HYPERCHOLESTEROLEMIA: ICD-10-CM

## 2024-12-02 DIAGNOSIS — Z23 IMMUNIZATION DUE: ICD-10-CM

## 2024-12-02 DIAGNOSIS — M72.2 PLANTAR FASCIITIS: ICD-10-CM

## 2024-12-02 DIAGNOSIS — I10 ESSENTIAL HYPERTENSION: ICD-10-CM

## 2024-12-02 DIAGNOSIS — Z00.00 ENCOUNTER FOR WELLNESS EXAMINATION IN ADULT: Primary | ICD-10-CM

## 2024-12-02 PROCEDURE — 90656 IIV3 VACC NO PRSV 0.5 ML IM: CPT | Performed by: FAMILY MEDICINE

## 2024-12-02 PROCEDURE — 90471 IMMUNIZATION ADMIN: CPT | Performed by: FAMILY MEDICINE

## 2024-12-02 PROCEDURE — 99396 PREV VISIT EST AGE 40-64: CPT | Performed by: FAMILY MEDICINE

## 2024-12-02 RX ORDER — TELMISARTAN AND HYDROCHLORTHIAZIDE 80; 25 MG/1; MG/1
1 TABLET ORAL DAILY
Qty: 90 TABLET | Refills: 3 | Status: SHIPPED | OUTPATIENT
Start: 2024-12-02 | End: 2025-12-02

## 2024-12-02 RX ORDER — FLUTICASONE PROPIONATE 50 MCG
2 SPRAY, SUSPENSION (ML) NASAL DAILY
Status: CANCELLED
Start: 2024-12-02 | End: 2025-12-02

## 2024-12-02 RX ORDER — MELOXICAM 15 MG/1
15 TABLET ORAL DAILY PRN
Qty: 30 TABLET | Refills: 1 | Status: SHIPPED | OUTPATIENT
Start: 2024-12-02 | End: 2025-01-31

## 2024-12-02 RX ORDER — ROSUVASTATIN CALCIUM 20 MG/1
20 TABLET, COATED ORAL NIGHTLY
Qty: 90 TABLET | Refills: 3 | Status: SHIPPED | OUTPATIENT
Start: 2024-12-02 | End: 2025-12-02

## 2024-12-02 NOTE — ASSESSMENT & PLAN NOTE
Plantar fasciitis is improved.  She will use meloxicam on an as-needed basis.  She is also cleared to use acetaminophen 1000 mg 3 times a day as needed for pain as well.  Continued emphasis on heat and stretching as needed  Orders:    meloxicam (MOBIC) 15 MG tablet; Take 1 tablet by mouth Daily As Needed for Moderate Pain for up to 60 days.

## 2024-12-02 NOTE — ASSESSMENT & PLAN NOTE
Blood pressure is well-controlled.  Continue same medication.  Recheck in 1 year    Orders:    telmisartan-hydrochlorothiazide (MICARDIS HCT) 80-25 MG per tablet; Take 1 tablet by mouth Daily.    Comprehensive Metabolic Panel; Future    CBC & Differential; Future    TSH; Future

## 2024-12-02 NOTE — ASSESSMENT & PLAN NOTE
Pure hypercholesterolemia showing excellent control with rosuvastatin 20 mg.  Continue same therapy recheck in 1 year continue with low-cholesterol low-fat diet.  Information has been shared in the after visit summary    Orders:    rosuvastatin (CRESTOR) 20 MG tablet; Take 1 tablet by mouth Every Night.    CK; Future    Lipid Panel With / Chol / HDL Ratio; Future

## 2024-12-02 NOTE — PROGRESS NOTES
Chief Complaint  Physical    Subjective        HPI      The patient presents for an annual wellness checkup.    She has been managing her plantar fasciitis with injections and meloxicam but recently stopped taking meloxicam, resulting in increased pain in her thumb and back. She is unable to receive another injection for a few months and is considering a permanent solution, although she was informed that she can only receive injections twice a year. She was previously exercising regularly but had to stop due to her foot condition. She is now able to walk again and has three pairs of shoes that she can wear.    She is on telmisartan/hydrochlorothiazide for blood pressure management and rosuvastatin for cholesterol control. Her weight has remained stable this year. She denies experiencing fatigue, decreased energy, or unexpected weight changes. She works from home and tries to walk 4 to 5 days a week. She has a gym membership for winter exercise.    She reports ringing in her ears, which was evaluated by an ENT specialist. Her hearing is normal but slightly reduced in one ear. She was treated with prednisone, which improved her symptoms. She also has TMJ and grinds her teeth. She has a bite guard and is considering a snore guard.    She reports no issues with her eyes, shortness of breath, cough, chest pain, palpitations, stomach or digestive issues, or difficulty urinating. She does report frequent urination, especially at night and in the morning, but attributes this to her high fluid intake. She reports no loss of bladder control, skin rashes or lesions, headaches, dizziness, easy bleeding or bruising, depression, or anxiety.    She has a history of left bundle branch block and palpitations, for which she sees Dr. Kaur. She is on metoprolol for this condition. She has seen Dr. Matias for hematology/oncology and Dr. Kelly for OB/GYN. She has also seen an ENT specialist. She does not have a gastroenterologist.  "She had a colonoscopy in 2019, which revealed abnormal cells. She was referred to a specialist who performed a scraping procedure and advised her to return in 10 years.    She is not currently receiving COVID-19 vaccines but is interested in getting the influenza vaccine. She has been using Flonase and has had recent lab work done. She is due for a mammogram, which was ordered by her OB/GYN two weeks ago.    She has a history of a  and no siblings. She has one daughter and one grandson.    SOCIAL HISTORY  - Never smoked  - Drinks 3 glasses of wine a week  - Still working with [employer]  - Plans to retire in 2024    FAMILY HISTORY  - Father is still doing good  - Mother  at age 76 after a stomach virus  - No siblings  - Has 1 daughter and 1 grandson  - Heart failure does not run in the family     Review of Systems   Constitutional:  Negative for fatigue.   HENT:  Positive for hearing loss and tinnitus.    Eyes: Negative.    Respiratory:  Negative for cough and shortness of breath.    Cardiovascular:  Negative for chest pain and palpitations.   Gastrointestinal: Negative.    Genitourinary:  Positive for frequency. Negative for difficulty urinating.   Musculoskeletal:  Positive for arthralgias and back pain. Negative for myalgias.   Skin:  Negative for rash.   Neurological:  Negative for dizziness.   Hematological:  Does not bruise/bleed easily.   Psychiatric/Behavioral:  Negative for dysphoric mood. The patient is not nervous/anxious.    All other systems reviewed and are negative.       Vital Signs:   Vitals:    24 1254   BP: 106/68   Pulse: 74   SpO2: 98%   Weight: 65.8 kg (145 lb)   Height: 152.4 cm (60\")            3/12/2024     8:30 AM   PHQ-2/PHQ-9 Depression Screening   Retired Little Interest or Pleasure in Doing Things 0-->not at all   Retired Feeling Down, Depressed or Hopeless 0-->not at all   Retired PHQ-9: Brief Depression Severity Measure Score 0       BMI is >= 25 and <30. " (Overweight) The following options were offered after discussion;: weight loss educational material (shared in after visit summary) and nutrition counseling/recommendations        Physical Exam  Constitutional:       Appearance: Normal appearance. She is well-developed and overweight.   HENT:      Head: Normocephalic.      Right Ear: Hearing, tympanic membrane and external ear normal.      Left Ear: Hearing, tympanic membrane and external ear normal.   Eyes:      General: Lids are normal.      Conjunctiva/sclera: Conjunctivae normal.      Pupils: Pupils are equal, round, and reactive to light.      Funduscopic exam:     Right eye: No AV nicking or papilledema.         Left eye: No AV nicking or papilledema.   Neck:      Thyroid: No thyroid mass or thyromegaly.      Vascular: No carotid bruit or JVD.      Trachea: Trachea normal.   Cardiovascular:      Rate and Rhythm: Normal rate and regular rhythm.      Pulses: Normal pulses.      Heart sounds: Normal heart sounds. No murmur heard.  Pulmonary:      Effort: Pulmonary effort is normal.      Breath sounds: Normal breath sounds.   Abdominal:      General: Bowel sounds are normal.      Palpations: Abdomen is soft.      Tenderness: There is no abdominal tenderness.   Musculoskeletal:         General: Normal range of motion.      Cervical back: Normal range of motion and neck supple.      Lumbar back: No bony tenderness.   Lymphadenopathy:      Cervical: No cervical adenopathy.      Upper Body:      Right upper body: No supraclavicular adenopathy.      Left upper body: No supraclavicular adenopathy.   Skin:     General: Skin is warm and dry.      Findings: No rash.      Nails: There is no clubbing.   Neurological:      Mental Status: She is alert and oriented to person, place, and time.      Cranial Nerves: No cranial nerve deficit.      Deep Tendon Reflexes:      Reflex Scores:       Patellar reflexes are 2+ on the right side and 2+ on the left side.  Psychiatric:          Speech: Speech normal.         Behavior: Behavior normal.         Thought Content: Thought content normal.         Judgment: Judgment normal.          - Eardrums appear normal    Vital Signs:    - Blood pressure: 106/68       The following data was reviewed by: Henry Jacinto MD on 12/02/2024:      Results  - Laboratory Studies:    - Sodium: 135    - Potassium: 4.0    - Chloride: 97    - Calcium: 9.6    - BUN: 20    - Creatinine: 0.82    - EGFR: 81    - Glucose: 88    - AST: 17    - ALT: 17    - Total bilirubin: 0.5    - Total cholesterol: 128    - HDL: 51    - LDL: 62    - Triglycerides: 74    - White blood cell count: 8    - Hemoglobin: 13.2    - Lymphocyte absolute: 4.4                Assessment and Plan        Encounter for wellness examination in adult  Recommendations for regular exercise 30 minutes 5 days a week as tolerated for cardiovascular wellness.  Flu vaccine administered today.       Essential hypertension  Blood pressure is well-controlled.  Continue same medication.  Recheck in 1 year    Orders:    telmisartan-hydrochlorothiazide (MICARDIS HCT) 80-25 MG per tablet; Take 1 tablet by mouth Daily.    Comprehensive Metabolic Panel; Future    CBC & Differential; Future    TSH; Future    Pure hypercholesterolemia  Pure hypercholesterolemia showing excellent control with rosuvastatin 20 mg.  Continue same therapy recheck in 1 year continue with low-cholesterol low-fat diet.  Information has been shared in the after visit summary    Orders:    rosuvastatin (CRESTOR) 20 MG tablet; Take 1 tablet by mouth Every Night.    CK; Future    Lipid Panel With / Chol / HDL Ratio; Future    Plantar fasciitis  Plantar fasciitis is improved.  She will use meloxicam on an as-needed basis.  She is also cleared to use acetaminophen 1000 mg 3 times a day as needed for pain as well.  Continued emphasis on heat and stretching as needed  Orders:    meloxicam (MOBIC) 15 MG tablet; Take 1 tablet by mouth Daily As Needed for  Moderate Pain for up to 60 days.    Immunization due    Orders:    Fluzone >6mos ([id number])         No follow-ups on file.     Patient was given instructions and counseling regarding her condition or for health maintenance advice. Please see specific information pulled into the AVS if appropriate.     Patient or patient representative verbalized consent for the use of Ambient Listening during the visit with  Henry Jacinto MD for chart documentation. 12/2/2024  13:31 EST

## 2024-12-13 ENCOUNTER — TELEPHONE (OUTPATIENT)
Dept: FAMILY MEDICINE CLINIC | Facility: CLINIC | Age: 59
End: 2024-12-13
Payer: COMMERCIAL

## 2024-12-13 NOTE — TELEPHONE ENCOUNTER
Hub staff attempted to follow warm transfer process and was unsuccessful     Caller: Radha Higgins    Relationship to patient: Self    Best call back number:     Patient is needing: PATIENT STATES SHE WENT TO URGENT CARE ON MONDAY FOR SORE THROAT, FEVER, CHILLS, AND STREP/FLU/COVID WERE ALL NEGATIVE AND SHE WAS NOT GIVEN ANY MEDICATION.     SHE STATES LAST NIGHT SHE WAS WORSE WITH THICK YELLOW PRODUCTIVE COUGH, GOOPY DISCHARGE IN HER EYES SO SHE DID A VIRTUAL URGENT CARE VISIT AND WAS PRESCRIBED EYE DROPS ONLY.     PATIENT WOULD LIKE MEDICATION PRESCRIBED FOR HER OTHER SYMPTOMS AND WAS UNABLE TO GET A VIRTUAL VISIT OR SAME DAY VISIT.      PLEASE CALL PATIENT TO DISCUSS PRESCRIBING HER MEDICATION.

## 2024-12-13 NOTE — TELEPHONE ENCOUNTER
DR Jacinto not back until Monday.  Advise she call back urgent care or be seen by one of our APRNs today if have opeining

## 2024-12-27 ENCOUNTER — TELEMEDICINE (OUTPATIENT)
Dept: FAMILY MEDICINE CLINIC | Facility: CLINIC | Age: 59
End: 2024-12-27
Payer: COMMERCIAL

## 2024-12-27 VITALS — BODY MASS INDEX: 28.47 KG/M2 | HEIGHT: 60 IN | WEIGHT: 145 LBS

## 2024-12-27 DIAGNOSIS — H69.93 DYSFUNCTION OF BOTH EUSTACHIAN TUBES: ICD-10-CM

## 2024-12-27 DIAGNOSIS — J06.9 URI WITH COUGH AND CONGESTION: Primary | ICD-10-CM

## 2024-12-27 PROCEDURE — 99213 OFFICE O/P EST LOW 20 MIN: CPT | Performed by: NURSE PRACTITIONER

## 2024-12-27 RX ORDER — AZITHROMYCIN 250 MG/1
TABLET, FILM COATED ORAL
Qty: 6 TABLET | Refills: 0 | Status: SHIPPED | OUTPATIENT
Start: 2024-12-27

## 2024-12-27 RX ORDER — METHYLPREDNISOLONE 4 MG/1
TABLET ORAL
Qty: 21 TABLET | Refills: 0 | Status: SHIPPED | OUTPATIENT
Start: 2024-12-27

## 2024-12-27 NOTE — PROGRESS NOTES
"Chief Complaint  Cough (Sore throat, ear pain, chills)    Subjective          Mode of communication: Video   You have chosen to receive care through a telehealth visit.  Do you consent to use a video/audio connection for your medical care today? Yes  Location of patient: home  Location of provider: [facility]  The visit included audio and video interaction.  No technical issues occurred during this visit.    Cough  Associated symptoms include ear pain, postnasal drip and a sore throat. Pertinent negatives include no chest pain, headaches, rash, shortness of breath or wheezing.      DENITA presents to [facility] as a 59 year old female for a video tele-health appointment c/o a  3  week history of sinus pain and pressure, headache, non productive cough, congestion, PND ear pain and eye drainage and \"canker sores\".  Denies any sob or wheezing, no fever, abdominal pain, no N/V/D.She is able to eat and keep down fluid.    Evaluation to date:  negative test for flu, covid, strep  12/12  -also seen by tele-health for conjunctivitis-  Treatment to date:  otc mucinex and cough suppressant, Augmentin and eye drops    . Denies exposure to illness      No other acute C/o today    The following portions of the patient's history were reviewed and updated as appropriate: allergies, current medications, past family history, past medical history, past social history, past surgical history, and problem list       Review of Systems   HENT:  Positive for congestion, ear pain, postnasal drip, sinus pressure and sore throat.    Eyes:  Negative for visual disturbance.   Respiratory:  Positive for cough. Negative for shortness of breath, wheezing and stridor.    Cardiovascular:  Negative for chest pain, palpitations and leg swelling.   Gastrointestinal:  Negative for abdominal pain, diarrhea, nausea and vomiting.   Skin:  Negative for rash.   Neurological:  Negative for dizziness.        Objective   Vital " "Signs: unable to assess-  no temp per pt  Vitals:    12/27/24 1425   Weight: 65.8 kg (145 lb)   Height: 152.4 cm (60\")      BMI 28.3      3/12/2024     8:30 AM   PHQ-2/PHQ-9 Depression Screening   Retired Little Interest or Pleasure in Doing Things 0-->not at all   Retired Feeling Down, Depressed or Hopeless 0-->not at all   Retired PHQ-9: Brief Depression Severity Measure Score 0               Virtual -  limited assessment  Physical Exam  Constitutional:       General: She is not in acute distress.  Cardiovascular:      Pulses: Normal pulses.      Heart sounds: No murmur heard.  Neurological:      Mental Status: She is alert and oriented to person, place, and time.          Result Review :     The following data was reviewed by: FLACA Conrad on 12/27/2024:           Assessment and Plan       URI with cough and congestion  Plan:    -Take all medications as prescribed and until completed.  -Monitor for fever and take Tylenol as needed.  Drink plenty of fluids and get plenty of rest.  -Use cool-mist humidifier as needed.  -Seek immediate medical attention for fever unrelieved by Tylenol, chest pain, shortness of breath, sharp back pain, or any other worsening signs or symptoms.  -The patient verbalized understanding of all instructions given today.            Dysfunction of both eustachian tubes  PCP previously treated with steroid pack-  worked well-  well tolerated           This was an audio and video enabled telemedicine encounter.    Video visit lasted approx 10 min  Follow Up   Return if symptoms worsen or fail to improve, for Follow up with PCP as Directed.  Patient was given instructions and counseling regarding her condition or for health maintenance advice. Please see specific information pulled into the AVS if appropriate.   "

## 2025-01-09 ENCOUNTER — OFFICE VISIT (OUTPATIENT)
Dept: FAMILY MEDICINE CLINIC | Facility: CLINIC | Age: 60
End: 2025-01-09
Payer: COMMERCIAL

## 2025-01-09 VITALS
BODY MASS INDEX: 28.07 KG/M2 | DIASTOLIC BLOOD PRESSURE: 80 MMHG | OXYGEN SATURATION: 99 % | WEIGHT: 143 LBS | HEART RATE: 87 BPM | HEIGHT: 60 IN | SYSTOLIC BLOOD PRESSURE: 130 MMHG

## 2025-01-09 DIAGNOSIS — J01.00 SUBACUTE MAXILLARY SINUSITIS: Primary | ICD-10-CM

## 2025-01-09 DIAGNOSIS — J18.9 COMMUNITY ACQUIRED PNEUMONIA OF LEFT LOWER LOBE OF LUNG: ICD-10-CM

## 2025-01-09 PROCEDURE — 99214 OFFICE O/P EST MOD 30 MIN: CPT | Performed by: FAMILY MEDICINE

## 2025-01-09 RX ORDER — LEVOFLOXACIN 500 MG/1
500 TABLET, FILM COATED ORAL DAILY
Qty: 10 TABLET | Refills: 0 | Status: SHIPPED | OUTPATIENT
Start: 2025-01-09 | End: 2025-01-19

## 2025-01-09 RX ORDER — ALBUTEROL SULFATE AND BUDESONIDE 90; 80 UG/1; UG/1
2 AEROSOL, METERED RESPIRATORY (INHALATION)
Qty: 10.7 G | Refills: 0 | Status: SHIPPED | OUTPATIENT
Start: 2025-01-09

## 2025-01-09 NOTE — PROGRESS NOTES
Chief Complaint  URI (Has been sick the whole month of december), Cough (Dry cough, makes her gag), and Pressure Behind the Eyes    Subjective        Cough  This is a new problem. The current episode started 1 to 4 weeks ago. The problem has been worsening. The problem occurs every few hours. The cough is Productive of yellow sputum. Associated symptoms include ear congestion, ear pain, headaches, nasal congestion and wheezing. The symptoms are aggravated by lying down. Risk factors for lung disease include animal exposure.         The patient presents for evaluation of a persistent cough.    She experienced an illness in 11/2024, which she initially believed had resolved. However, on 12/08/2024, she awoke with symptoms including a sore throat, chills, body aches, and fever. Despite these symptoms, she proceeded with her plans to visit [facility] with her sister-in-law. The following day, she sought medical attention at a local urgent care facility where she was tested for influenza, COVID-19, and strep, all of which returned negative results. She was discharged without any medication. Subsequently, her eyes became bloodshot and glued together, prompting her to seek help online. She was prescribed eye drops, which alleviated her ocular symptoms. However, her overall condition did not improve, leading her to seek further assistance online. She was then prescribed Augmentin, which improved her symptoms. On 12/27/2024, she had a telemedicine consultation with Marii, who prescribed a Z-Andrew and prednisone due to the persistence of her symptoms. Currently, she reports no fever, chills, or body aches. She does not believe she is contagious as her  has not contracted any illness from her. She reports productive cough with yellow sputum and sinus pain. She also reports ear pain during coughing episodes and a persistent sore in the back of her throat. She describes her cough as severe, often leading to near  "vomiting, and finds it difficult to suppress once it starts. She has been using over-the-counter medications and Flonase for symptom management. She has a history of pneumonia, having contracted it approximately 25 years ago.    MEDICATIONS  - Current: Flonase  - Past: amoxicillin, Augmentin, Z-Andrew, prednisone     Review of Systems   HENT:  Positive for ear pain.    Respiratory:  Positive for cough and wheezing.         Vital Signs:   Vitals:    01/09/25 1039   BP: 130/80   Pulse: 87   SpO2: 99%   Weight: 64.9 kg (143 lb)   Height: 152.4 cm (60\")            1/9/2025    10:44 AM   PHQ-2/PHQ-9 Depression Screening   Little interest or pleasure in doing things Not at all   Feeling down, depressed, or hopeless Not at all                 Physical Exam     General Appearance: No Acute Distress, normal appearance, well developed, well nourished.  Vital signs: Vital Signs reviewed.  HEENT: Both tympanic membranes appear normal with no redness. There may be some fluid behind the right eardrum. A small pustule is present on the left posterior pharynx, while the right posterior pharynx appears normal. Dentition, buccal mucosa, and tongue are all normal. Left maxillary sinus is tender to palpation.  Respiratory: Wheezing is present during deep inspirations in both lobes. Rales are heard on the left lung, and crackles on that side. The right lung is clear except for the inspiratory wheezes which are diffuse on both sides.  Cardiovascular: Regular rate and ryhthym with no murmurs, rubs, or gallop. No carotid bruits auscultated bilaterally.  Lymphatic: Cervical anterior chains are normal with no lymphadenopathy.  Extremities: No pretibial edema, bilaterally.  Skin: Warm and dry, no rash.  Psychiatric: patient cooperative, normal affect.             Results                  Assessment and Plan        Subacute maxillary sinusitis    Orders:    levoFLOXacin (LEVAQUIN) 500 MG tablet; Take 1 tablet by mouth Daily for 10 days.    " Albuterol-Budesonide (Airsupra) 90-80 MCG/ACT aerosol; Inhale 2 puffs Every 4 (Four) to 6 (Six) Hours As Needed (wheezing).    Community acquired pneumonia of left lower lobe of lung    Orders:    levoFLOXacin (LEVAQUIN) 500 MG tablet; Take 1 tablet by mouth Daily for 10 days.    Albuterol-Budesonide (Airsupra) 90-80 MCG/ACT aerosol; Inhale 2 puffs Every 4 (Four) to 6 (Six) Hours As Needed (wheezing).         Return in about 2 weeks (around 1/23/2025) for recheck of current condition.     Patient was given instructions and counseling regarding her condition or for health maintenance advice. Please see specific information pulled into the AVS if appropriate.     Patient or patient representative verbalized consent for the use of Ambient Listening during the visit with  Henry Jacinto MD for chart documentation. 1/9/2025  11:13 EST

## 2025-01-29 ENCOUNTER — OFFICE VISIT (OUTPATIENT)
Dept: FAMILY MEDICINE CLINIC | Facility: CLINIC | Age: 60
End: 2025-01-29
Payer: COMMERCIAL

## 2025-01-29 VITALS
HEIGHT: 60 IN | WEIGHT: 146.2 LBS | DIASTOLIC BLOOD PRESSURE: 66 MMHG | SYSTOLIC BLOOD PRESSURE: 110 MMHG | HEART RATE: 66 BPM | TEMPERATURE: 98.3 F | OXYGEN SATURATION: 100 % | BODY MASS INDEX: 28.7 KG/M2

## 2025-01-29 DIAGNOSIS — J01.00 ACUTE NON-RECURRENT MAXILLARY SINUSITIS: Primary | ICD-10-CM

## 2025-01-29 PROCEDURE — 99212 OFFICE O/P EST SF 10 MIN: CPT | Performed by: FAMILY MEDICINE

## 2025-01-29 NOTE — PROGRESS NOTES
"Chief Complaint  pneumonia (Follow up)    Subjective        Cough  The current episode started more than 1 month ago. The problem has been resolved. The problem occurs every few minutes. The cough is Productive of yellow sputum. Associated symptoms include ear pain and headaches. The symptoms are aggravated by lying down.         The patient presents for evaluation of sinusitis.    She reports experiencing wheezing and sinus pain, which were effectively managed with Aerospan, a medication she used for approximately 10 days. She also experienced a sensation of popping in her sinuses. She has been managing her symptoms with allergy medications and does not routinely take any asthma-related medications. She notes that this is the first time in a long time that she has experienced such severe symptoms. She typically responds well to antibiotics prescribed at urgent care visits. However, she has had an unusually high number of illnesses this past year, with four different bouts of sickness. She recalls a particularly severe episode last 2024 when she contracted norovirus from her grandchild, which was so debilitating that she was unable to attend her appointment and felt as though she nearly . She attributes her recovery to the combined use of Aerospan and levofloxacin, the latter of which she tolerated well. Prior to this, she had tried two other antibiotics without success. Her symptoms persisted for nearly 6 weeks, during which she experienced coughing and phlegm production. She reports feeling significantly better now.    MEDICATIONS  - Current: levofloxacin     Review of Systems   HENT:  Positive for ear pain.    Respiratory:  Positive for cough.         Vital Signs:   Vitals:    25 1343   BP: 110/66   BP Location: Right arm   Patient Position: Sitting   Cuff Size: Adult   Pulse: 66   Temp: 98.3 °F (36.8 °C)   TempSrc: Oral   SpO2: 100%   Weight: 66.3 kg (146 lb 3.2 oz)   Height: 152.4 cm (60\") "   PainSc:   8   PainLoc: Foot            1/9/2025    10:44 AM   PHQ-2/PHQ-9 Depression Screening   Little interest or pleasure in doing things Not at all   Feeling down, depressed, or hopeless Not at all                 Physical Exam     General Appearance: No Acute Distress, normal appearance, well developed, well nourished.  Vital signs: Vital Signs reviewed.  Respiratory: Lungs are clear to auscultation. No rhonchi, no rales. Normal respiratory effort. No wheezes.  Cardiovascular: Regular rate and ryhthym with no murmurs, rubs, or gallop. No carotid bruits auscultated bilaterally.               Results                  Assessment and Plan        Acute non-recurrent maxillary sinusitis  resolved          No follow-ups on file.     Patient was given instructions and counseling regarding her condition or for health maintenance advice. Please see specific information pulled into the AVS if appropriate.     Patient or patient representative verbalized consent for the use of Ambient Listening during the visit with  Henry Jacinto MD for chart documentation. 1/29/2025  14:10 EST

## 2025-03-30 DIAGNOSIS — I10 ESSENTIAL HYPERTENSION: ICD-10-CM

## 2025-03-31 RX ORDER — METOPROLOL SUCCINATE 25 MG/1
25 TABLET, EXTENDED RELEASE ORAL DAILY
Qty: 90 TABLET | Refills: 0 | Status: SHIPPED | OUTPATIENT
Start: 2025-03-31

## 2025-03-31 NOTE — TELEPHONE ENCOUNTER
NOV-06/11/25-  LOV-06/06/24-CPS    Plan  PVCs -improved on increase metoprolol succinate.  Continue the same  Paroxysmal SVT -noted rare occasions on monitor.  No related symptoms  Left bundle branch block -chronic, no anginal symptoms.  Echocardiogram with normal LVEF from March 2022  Hypertension -controlled on present regimen  Hyperlipidemia -appropriately treated on 20 mg of rosuvastatin     Patient seen today for follow-up.  Symptoms are stable and I am not recommending any changes     Follow-up with Dr. Kaur in 1 year

## 2025-05-31 DIAGNOSIS — I10 ESSENTIAL HYPERTENSION: ICD-10-CM

## 2025-06-02 RX ORDER — METOPROLOL SUCCINATE 25 MG/1
25 TABLET, EXTENDED RELEASE ORAL DAILY
Qty: 90 TABLET | Refills: 3 | OUTPATIENT
Start: 2025-06-02

## 2025-06-11 ENCOUNTER — OFFICE VISIT (OUTPATIENT)
Dept: CARDIOLOGY | Age: 60
End: 2025-06-11
Payer: COMMERCIAL

## 2025-06-11 VITALS
HEIGHT: 60 IN | BODY MASS INDEX: 28.86 KG/M2 | HEART RATE: 72 BPM | DIASTOLIC BLOOD PRESSURE: 82 MMHG | WEIGHT: 147 LBS | SYSTOLIC BLOOD PRESSURE: 124 MMHG

## 2025-06-11 DIAGNOSIS — I44.7 LBBB (LEFT BUNDLE BRANCH BLOCK): ICD-10-CM

## 2025-06-11 DIAGNOSIS — E78.00 PURE HYPERCHOLESTEROLEMIA: ICD-10-CM

## 2025-06-11 DIAGNOSIS — I10 ESSENTIAL HYPERTENSION: ICD-10-CM

## 2025-06-11 DIAGNOSIS — I49.3 PVC'S (PREMATURE VENTRICULAR CONTRACTIONS): Primary | ICD-10-CM

## 2025-06-11 DIAGNOSIS — I65.22 CALCIFICATION OF LEFT CAROTID ARTERY: ICD-10-CM

## 2025-06-11 PROCEDURE — 99214 OFFICE O/P EST MOD 30 MIN: CPT | Performed by: INTERNAL MEDICINE

## 2025-06-11 PROCEDURE — 93000 ELECTROCARDIOGRAM COMPLETE: CPT | Performed by: INTERNAL MEDICINE

## 2025-06-11 NOTE — PROGRESS NOTES
Date of Office Visit: 2025  Encounter Provider: Claire Kaur MD  Place of Service: [facility] CARDIOLOGY  Patient Name: Radha Higgins  :1965      Patient ID:  Radha Higgins is a 59 y.o. female is here for  followup for PVCs, hypertension.        History of Present Illness    She has a history of hypertension, PVCs, LBBB, hyperlipidemia, Raynaud's disease.      She had a 13-day monitor done 2022 showing sinus rhythm, 3 episodes of nonsustained SVT lasting 8 beats and frequent PVCs of 5.6%-palpitations correlated with PVCs.  She was treated with metoprolol succinate 12.5 mg daily.  Echocardiogram done 2022 showed ejection fraction 63% with grade 2 diastolic dysfunction, negative saline contrast study no significant valvular abnormalities.     He had COVID-19 and her PVCs did get worse.  She notices that some days are worse with PVCs and others.  Labs 2024 show normal CMP, normal TSH, total cholesterol 28, HDL 51, LDL 62, triglycerides 74, VLDL 15, normal CBC.    She overall feels well.  She has planter fasciitis but is trying to walk 4 days a week 1.5 miles.  She is on meloxicam for the plantars fasciitis.  She has no chest pain or pressure.  She does not feel her heart racing or skipping.  She has had no dizziness, syncope or falls.  She has no orthopnea or PND.  She has no exertional dyspnea.    Past Medical History:   Diagnosis Date    Abnormal     Allergic     Arthritis     GERD (gastroesophageal reflux disease)     H/O Anal skin tag     History of colon polyp     History of snoring     Hyperlipidemia     Hypertension     LBBB (left bundle branch block)     Migraine     PVC's (premature ventricular contractions)     Raynaud's syndrome     Seasonal allergies     Sensory neuropathy, positional, left upper extremity 2018    Tick bite of abdominal wall 2023         Past Surgical History:   Procedure Laterality Date      SECTION  1994    failure to progress    COLONOSCOPY  2019    One polyp removed       Current Outpatient Medications on File Prior to Visit   Medication Sig Dispense Refill    Calcium Carbonate-Vitamin D3 600-400 MG-UNIT tablet Take 2 tablets by mouth Daily.      CombiPatch 0.05-0.25 MG/DAY       fluticasone (FLONASE) 50 MCG/ACT nasal spray 2 sprays into the nostril(s) as directed by provider Daily.      lansoprazole (PREVACID) 15 MG capsule Take 1 capsule by mouth Every Morning.      meloxicam (MOBIC) 15 MG tablet Take 1 tablet by mouth Daily As Needed for Moderate Pain for up to 60 days. 30 tablet 1    metoprolol succinate XL (TOPROL-XL) 25 MG 24 hr tablet TAKE ONE TABLET BY MOUTH EVERY DAY 90 tablet 0    Multiple Vitamins-Minerals (MULTIVITAMIN ADULT PO) Take  by mouth.      rosuvastatin (CRESTOR) 20 MG tablet Take 1 tablet by mouth Every Night. 90 tablet 3    telmisartan-hydrochlorothiazide (MICARDIS HCT) 80-25 MG per tablet Take 1 tablet by mouth Daily. 90 tablet 3     No current facility-administered medications on file prior to visit.       Social History     Socioeconomic History    Marital status:      Spouse name: Khang    Number of children: 1   Tobacco Use    Smoking status: Never     Passive exposure: Never    Smokeless tobacco: Never   Vaping Use    Vaping status: Never Used   Substance and Sexual Activity    Alcohol use: Yes     Alcohol/week: 3.0 standard drinks of alcohol     Types: 3 Glasses of wine per week     Comment: occasionally.    Drug use: Never    Sexual activity: Yes     Partners: Male     Birth control/protection: Post-menopausal             Procedures    ECG 12 Lead    Date/Time: 6/11/2025 9:01 AM  Performed by: Claire Kaur MD    Authorized by: Claire Kaur MD  Comparison: compared with previous ECG   Similar to previous ECG  Rhythm: sinus rhythm  Conduction: left bundle branch block    Clinical impression: abnormal EKG              Objective:      Vitals:     "06/11/25 0844   BP: 124/82   Pulse: 72   Weight: 66.7 kg (147 lb)   Height: 152.4 cm (60\")     Body mass index is 28.71 kg/m².    Vitals reviewed.   Constitutional:       General: Not in acute distress.     Appearance: Not diaphoretic.   Neck:      Vascular: No carotid bruit or JVD.   Pulmonary:      Effort: Pulmonary effort is normal.      Breath sounds: Normal breath sounds.   Cardiovascular:      Normal rate. Regular rhythm.      Murmurs: There is no murmur.      No gallop.  No rub.   Pulses:     Intact distal pulses.      Carotid: 2+ bilaterally.     Radial: 2+ bilaterally.     Dorsalis pedis: 2+ bilaterally.     Posterior tibial: 2+ bilaterally.  Edema:     Peripheral edema absent.   Neurological:      Cranial Nerves: No cranial nerve deficit.         Lab Review:       Assessment:      Diagnosis Plan   1. PVC's (premature ventricular contractions)  Adult Transthoracic Echo Complete W/ Cont if Necessary Per Protocol      2. Pure hypercholesterolemia  Adult Transthoracic Echo Complete W/ Cont if Necessary Per Protocol    Vascular Screening (Bundle) CAR      3. LBBB (left bundle branch block)  Adult Transthoracic Echo Complete W/ Cont if Necessary Per Protocol      4. Essential hypertension  Adult Transthoracic Echo Complete W/ Cont if Necessary Per Protocol    Vascular Screening (Bundle) CAR      5. Calcification of left carotid artery  Vascular Screening (Bundle) CAR          PVCs, controlled with metoprolol succinate to 25 mg daily.  PSVT: Rare  Left bundle branch block.  Echocardiogram in March 2022 showed EF 62.5%, grade 2 LV diastolic dysfunction with high LAP, and trace mitral and tricuspid insufficiency.    Hypertension-controlled on telmisartan-HCTZ and metoprolol succinate.  Hyperlipidemia: She is treated with 20 mg Crestor daily.     Plan:       Repeat echocardiogram, no medication changes, vascular screening for left carotid calcification noted on Panorex, see Darline in 1 year.  Overall she is doing " well.

## 2025-06-18 DIAGNOSIS — I10 ESSENTIAL HYPERTENSION: ICD-10-CM

## 2025-06-18 RX ORDER — METOPROLOL SUCCINATE 25 MG/1
25 TABLET, EXTENDED RELEASE ORAL DAILY
Qty: 90 TABLET | Refills: 3 | Status: SHIPPED | OUTPATIENT
Start: 2025-06-18

## 2025-06-23 ENCOUNTER — HOSPITAL ENCOUNTER (OUTPATIENT)
Dept: CARDIOLOGY | Facility: HOSPITAL | Age: 60
Discharge: HOME | End: 2025-06-23
Admitting: INTERNAL MEDICINE
Payer: COMMERCIAL

## 2025-06-23 ENCOUNTER — TELEPHONE (OUTPATIENT)
Dept: CARDIOLOGY | Facility: CLINIC | Age: 60
End: 2025-06-23
Payer: COMMERCIAL

## 2025-06-23 VITALS
SYSTOLIC BLOOD PRESSURE: 140 MMHG | BODY MASS INDEX: 28.86 KG/M2 | DIASTOLIC BLOOD PRESSURE: 82 MMHG | HEIGHT: 60 IN | WEIGHT: 147 LBS | HEART RATE: 70 BPM

## 2025-06-23 DIAGNOSIS — E78.00 PURE HYPERCHOLESTEROLEMIA: ICD-10-CM

## 2025-06-23 DIAGNOSIS — I49.3 PVC'S (PREMATURE VENTRICULAR CONTRACTIONS): ICD-10-CM

## 2025-06-23 DIAGNOSIS — I44.7 LBBB (LEFT BUNDLE BRANCH BLOCK): ICD-10-CM

## 2025-06-23 DIAGNOSIS — I10 ESSENTIAL HYPERTENSION: ICD-10-CM

## 2025-06-23 LAB
AORTIC DIMENSIONLESS INDEX: 0.78 (DI)
ASCENDING AORTA: 2.5 CM
AV MEAN PRESS GRAD SYS DOP V1V2: 6 MMHG
AV VMAX SYS DOP: 162 CM/SEC
BH CV ECHO LEFT VENTRICLE GLOBAL LONGITUDINAL STRAIN: -23.7 %
BH CV ECHO MEAS - ACS: 1.9 CM
BH CV ECHO MEAS - AO MAX PG: 10.5 MMHG
BH CV ECHO MEAS - AO ROOT AREA (BSA CORRECTED): 1.5 CM2
BH CV ECHO MEAS - AO ROOT DIAM: 2.47 CM
BH CV ECHO MEAS - AO V2 VTI: 35.3 CM
BH CV ECHO MEAS - AVA(I,D): 2.39 CM2
BH CV ECHO MEAS - EDV(CUBED): 59.3 ML
BH CV ECHO MEAS - EDV(MOD-SP2): 85 ML
BH CV ECHO MEAS - EDV(MOD-SP4): 71 ML
BH CV ECHO MEAS - EF(MOD-SP2): 65.9 %
BH CV ECHO MEAS - EF(MOD-SP4): 71.8 %
BH CV ECHO MEAS - ESV(CUBED): 18.7 ML
BH CV ECHO MEAS - ESV(MOD-SP2): 29 ML
BH CV ECHO MEAS - ESV(MOD-SP4): 20 ML
BH CV ECHO MEAS - FS: 31.9 %
BH CV ECHO MEAS - IVS/LVPW: 1 CM
BH CV ECHO MEAS - IVSD: 0.8 CM
BH CV ECHO MEAS - LAT PEAK E' VEL: 9.7 CM/SEC
BH CV ECHO MEAS - LV DIASTOLIC VOL/BSA (35-75): 43.4 CM2
BH CV ECHO MEAS - LV MASS(C)D: 89.7 GRAMS
BH CV ECHO MEAS - LV MAX PG: 7.6 MMHG
BH CV ECHO MEAS - LV MEAN PG: 4 MMHG
BH CV ECHO MEAS - LV SYSTOLIC VOL/BSA (12-30): 12.2 CM2
BH CV ECHO MEAS - LV V1 MAX: 138 CM/SEC
BH CV ECHO MEAS - LV V1 VTI: 27.7 CM
BH CV ECHO MEAS - LVIDD: 3.9 CM
BH CV ECHO MEAS - LVIDS: 2.7 CM
BH CV ECHO MEAS - LVOT AREA: 3.1 CM2
BH CV ECHO MEAS - LVOT DIAM: 1.97 CM
BH CV ECHO MEAS - LVPWD: 0.8 CM
BH CV ECHO MEAS - MED PEAK E' VEL: 6.4 CM/SEC
BH CV ECHO MEAS - MR MAX PG: 71 MMHG
BH CV ECHO MEAS - MR MAX VEL: 421.3 CM/SEC
BH CV ECHO MEAS - MV A DUR: 0.13 SEC
BH CV ECHO MEAS - MV A MAX VEL: 119 CM/SEC
BH CV ECHO MEAS - MV DEC SLOPE: 890.9 CM/SEC2
BH CV ECHO MEAS - MV DEC TIME: 0.16 SEC
BH CV ECHO MEAS - MV E MAX VEL: 119 CM/SEC
BH CV ECHO MEAS - MV E/A: 1
BH CV ECHO MEAS - MV MAX PG: 7.3 MMHG
BH CV ECHO MEAS - MV MEAN PG: 3.6 MMHG
BH CV ECHO MEAS - MV P1/2T: 36.5 MSEC
BH CV ECHO MEAS - MV V2 VTI: 34.6 CM
BH CV ECHO MEAS - MVA(P1/2T): 6 CM2
BH CV ECHO MEAS - MVA(VTI): 2.44 CM2
BH CV ECHO MEAS - PA V2 MAX: 114.6 CM/SEC
BH CV ECHO MEAS - PULM A REVS DUR: 0.13 SEC
BH CV ECHO MEAS - PULM A REVS VEL: 28.4 CM/SEC
BH CV ECHO MEAS - PULM DIAS VEL: 41.2 CM/SEC
BH CV ECHO MEAS - PULM S/D: 1.43
BH CV ECHO MEAS - PULM SYS VEL: 58.9 CM/SEC
BH CV ECHO MEAS - QP/QS: 0.55
BH CV ECHO MEAS - RAP SYSTOLE: 3 MMHG
BH CV ECHO MEAS - RV MAX PG: 1.28 MMHG
BH CV ECHO MEAS - RV V1 MAX: 56.6 CM/SEC
BH CV ECHO MEAS - RV V1 VTI: 14.9 CM
BH CV ECHO MEAS - RVOT DIAM: 2 CM
BH CV ECHO MEAS - RVSP: 28 MMHG
BH CV ECHO MEAS - SV(LVOT): 84.5 ML
BH CV ECHO MEAS - SV(MOD-SP2): 56 ML
BH CV ECHO MEAS - SV(MOD-SP4): 51 ML
BH CV ECHO MEAS - SV(RVOT): 46.6 ML
BH CV ECHO MEAS - SVI(LVOT): 51.6 ML/M2
BH CV ECHO MEAS - SVI(MOD-SP2): 34.2 ML/M2
BH CV ECHO MEAS - SVI(MOD-SP4): 31.1 ML/M2
BH CV ECHO MEAS - TAPSE (>1.6): 1.87 CM
BH CV ECHO MEAS - TR MAX PG: 25.2 MMHG
BH CV ECHO MEAS - TR MAX VEL: 251.1 CM/SEC
BH CV ECHO MEASUREMENTS AVERAGE E/E' RATIO: 14.78
BH CV XLRA - RV BASE: 3 CM
BH CV XLRA - RV LENGTH: 5.7 CM
BH CV XLRA - RV MID: 2.21 CM
BH CV XLRA - TDI S': 10.3 CM/SEC
LEFT ATRIUM VOLUME INDEX: 22.7 ML/M2
LV EF BIPLANE MOD: 68.9 %
SINUS: 2.6 CM
STJ: 2.14 CM

## 2025-06-23 PROCEDURE — 93306 TTE W/DOPPLER COMPLETE: CPT | Performed by: INTERNAL MEDICINE

## 2025-06-23 PROCEDURE — 93356 MYOCRD STRAIN IMG SPCKL TRCK: CPT | Performed by: INTERNAL MEDICINE

## 2025-06-23 PROCEDURE — 93356 MYOCRD STRAIN IMG SPCKL TRCK: CPT

## 2025-06-23 PROCEDURE — 93306 TTE W/DOPPLER COMPLETE: CPT

## 2025-06-23 NOTE — TELEPHONE ENCOUNTER
Overall, echo looks stable or better.  The tricuspid valve is leaking a bit more but the diastolic function is now normal and heart strong.  No medication changes at this time, no other testing at this time.  Please call and let her know

## 2025-06-23 NOTE — TELEPHONE ENCOUNTER
Results and recommendations called to pt.  Instructed to call with any further questions or concerns.  Verbalized understanding.    Jessica Lopez RN  Triage Nurse, [facility] [place]  06/23/25 09:34 EDT

## 2025-06-25 ENCOUNTER — HOSPITAL ENCOUNTER (OUTPATIENT)
Dept: ULTRASOUND IMAGING | Facility: HOSPITAL | Age: 60
Discharge: HOME | End: 2025-06-25
Admitting: INTERNAL MEDICINE

## 2025-06-25 ENCOUNTER — RESULTS FOLLOW-UP (OUTPATIENT)
Dept: CARDIOLOGY | Age: 60
End: 2025-06-25
Payer: COMMERCIAL

## 2025-06-25 DIAGNOSIS — I10 ESSENTIAL HYPERTENSION: ICD-10-CM

## 2025-06-25 DIAGNOSIS — I65.22 CALCIFICATION OF LEFT CAROTID ARTERY: ICD-10-CM

## 2025-06-25 DIAGNOSIS — E78.00 PURE HYPERCHOLESTEROLEMIA: ICD-10-CM

## 2025-06-25 LAB
BH CV VAS SCREENING CAROTID CCA LEFT: 69 CM/SEC
BH CV VAS SCREENING CAROTID CCA RIGHT: 75 CM/SEC
BH CV VAS SCREENING CAROTID ICA LEFT: 81 CM/SEC
BH CV VAS SCREENING CAROTID ICA RIGHT: 103 CM/SEC
BH CV XLRA MEAS - MID AO DIAM: 1.63 CM
BH CV XLRA MEAS - PAD LEFT ABI PT: 1.24
BH CV XLRA MEAS - PAD LEFT ARM: 121 MMHG
BH CV XLRA MEAS - PAD LEFT LEG PT: 150 MMHG
BH CV XLRA MEAS - PAD RIGHT ABI PT: 1.2
BH CV XLRA MEAS - PAD RIGHT ARM: 120 MMHG
BH CV XLRA MEAS - PAD RIGHT LEG PT: 145 MMHG
BH CV XLRA MEAS LEFT ICA/CCA RATIO: 1.17
BH CV XLRA MEAS RIGHT ICA/CCA RATIO: 1.37

## 2025-06-25 PROCEDURE — 93799 UNLISTED CV SVC/PROCEDURE: CPT

## 2025-06-25 PROCEDURE — VASCULARSCN2 VASCULAR SCREENING (BUNDLE) CAR: Performed by: INTERNAL MEDICINE
